# Patient Record
Sex: FEMALE | Race: WHITE | NOT HISPANIC OR LATINO | Employment: FULL TIME | URBAN - METROPOLITAN AREA
[De-identification: names, ages, dates, MRNs, and addresses within clinical notes are randomized per-mention and may not be internally consistent; named-entity substitution may affect disease eponyms.]

---

## 2023-05-01 ENCOUNTER — OFFICE VISIT (OUTPATIENT)
Dept: PRIMARY CARE | Facility: CLINIC | Age: 58
End: 2023-05-01
Payer: COMMERCIAL

## 2023-05-01 VITALS
RESPIRATION RATE: 16 BRPM | TEMPERATURE: 98.3 F | WEIGHT: 137 LBS | BODY MASS INDEX: 22.02 KG/M2 | HEART RATE: 92 BPM | SYSTOLIC BLOOD PRESSURE: 131 MMHG | OXYGEN SATURATION: 95 % | HEIGHT: 66 IN | DIASTOLIC BLOOD PRESSURE: 84 MMHG

## 2023-05-01 DIAGNOSIS — Z00.00 ENCOUNTER FOR ANNUAL PHYSICAL EXAM: Primary | ICD-10-CM

## 2023-05-01 DIAGNOSIS — Z78.9 VEGETARIAN: ICD-10-CM

## 2023-05-01 DIAGNOSIS — Z00.00 HEALTHCARE MAINTENANCE: ICD-10-CM

## 2023-05-01 DIAGNOSIS — R05.3 CHRONIC COUGH: ICD-10-CM

## 2023-05-01 PROCEDURE — 99396 PREV VISIT EST AGE 40-64: CPT | Performed by: FAMILY MEDICINE

## 2023-05-01 PROCEDURE — 1036F TOBACCO NON-USER: CPT | Performed by: FAMILY MEDICINE

## 2023-05-01 RX ORDER — CYANOCOBALAMIN (VITAMIN B-12) 500 MCG
400 TABLET ORAL DAILY
COMMUNITY

## 2023-05-01 RX ORDER — ONDANSETRON HYDROCHLORIDE 8 MG/1
8 TABLET, FILM COATED ORAL EVERY 8 HOURS PRN
COMMUNITY
Start: 2022-06-22

## 2023-05-01 RX ORDER — MULTIVITAMIN
1 TABLET ORAL DAILY
COMMUNITY

## 2023-05-01 NOTE — PROGRESS NOTES
"Subjective   PMH:  colon polyps-C-scope 2020 [facility] rpt 3 yr  dad w breast ca  PPX b/l mastectomy. neg genetic testing 2015  tamoxifen x 5 yr. DC 2015   NO MAMMOS  nml pap neg HPV 6/2021 [facility]  hosp-duodenal diverticulitis 11/2021  EGD GERD 4/2022  vegeterian x 20 yr  works from home   HYSTERECTOMY ([facility]) 6/2022    Hysterectomy in June for thickened endometerium but path was benign (fibroids)  Mood is good  Getting c-scope in June for ongoing GI issues  Notes cough in throat for a few mo. DC protonix in early march by GI       Current Outpatient Medications on File Prior to Visit   Medication Sig Dispense Refill    cholecalciferol (Vitamin D-3) 10 MCG (400 UNIT) tablet Take 1 tablet (400 Units) by mouth once daily.      multivitamin (Multiple Vitamins) tablet Take 1 tablet by mouth once daily.      ondansetron (Zofran) 8 mg tablet Take 1 tablet (8 mg) by mouth every 8 hours if needed.       No current facility-administered medications on file prior to visit.                  Objective   /84   Pulse 92   Temp 36.8 °C (98.3 °F)   Resp 16   Ht 1.685 m (5' 6.34\")   Wt 62.1 kg (137 lb)   SpO2 95%   BMI 21.89 kg/m²    Physical Exam  General: NAD  HEENT:NCAT, PERRLA, nml OP, b/l TM clear, patent nares   Neck: no cervical DIANN  Heart: RRR no murmur, no edema   Lungs: CTA b/l, no wheeze or rhonchi   GI: abd soft, nontender, nondistended.   Breast: symmetric, no masses, rashes, lesions, axillary DIANN. B/l implants   MSK: no c/c/e. nml gait   Skin: warm and dry  Psych: cooperative, appropriate affect  Neuro: speech clear. A&Ox3  Assessment/Plan   Problem List Items Addressed This Visit    None  Visit Diagnoses       Encounter for annual physical exam    -  Primary  CPE:overall doing well. Discussed diet/ex changes  BMI: 21 at goal  VACC: reviewed UTD, only one shingrix d/t intol   COLON CA SCRN: upcoming c-scope next mo  LABS: ordered  PAP: no longer needed to [facility] for benign reason   MAMMO: b/l mastectomy, no " mammos  DEXA: 65   RTC yearly or prn for CPE w labs prior     Relevant Orders    CBC and Auto Differential    Comprehensive Metabolic Panel    Hemoglobin A1C    Lipid Panel    TSH with reflex to Free T4 if abnormal    Vegetarian        Relevant Orders    Vitamin B12    Folate    Vitamin B12    Folate    Chronic cough      -c/w GERD assoc cough. Started ater DC protonix. Trial of restarting protonix. If cough does not improve, will RF to ENT. No upper resp symtpoms.

## 2023-05-02 ENCOUNTER — LAB (OUTPATIENT)
Dept: LAB | Facility: LAB | Age: 58
End: 2023-05-02
Payer: COMMERCIAL

## 2023-05-02 DIAGNOSIS — Z00.00 ENCOUNTER FOR ANNUAL PHYSICAL EXAM: ICD-10-CM

## 2023-05-02 DIAGNOSIS — Z00.00 HEALTHCARE MAINTENANCE: ICD-10-CM

## 2023-05-02 DIAGNOSIS — Z78.9 VEGETARIAN: ICD-10-CM

## 2023-05-02 LAB
BASOPHILS (10*3/UL) IN BLOOD BY AUTOMATED COUNT: 0.02 X10E9/L (ref 0–0.1)
BASOPHILS/100 LEUKOCYTES IN BLOOD BY AUTOMATED COUNT: 0.4 % (ref 0–2)
COBALAMIN (VITAMIN B12) (PG/ML) IN SER/PLAS: 394 PG/ML (ref 211–911)
EOSINOPHILS (10*3/UL) IN BLOOD BY AUTOMATED COUNT: 0.18 X10E9/L (ref 0–0.7)
EOSINOPHILS/100 LEUKOCYTES IN BLOOD BY AUTOMATED COUNT: 3.3 % (ref 0–6)
ERYTHROCYTE DISTRIBUTION WIDTH (RATIO) BY AUTOMATED COUNT: 12.3 % (ref 11.5–14.5)
ERYTHROCYTE MEAN CORPUSCULAR HEMOGLOBIN CONCENTRATION (G/DL) BY AUTOMATED: 32.9 G/DL (ref 32–36)
ERYTHROCYTE MEAN CORPUSCULAR VOLUME (FL) BY AUTOMATED COUNT: 97 FL (ref 80–100)
ERYTHROCYTES (10*6/UL) IN BLOOD BY AUTOMATED COUNT: 4.3 X10E12/L (ref 4–5.2)
ESTIMATED AVERAGE GLUCOSE FOR HBA1C: 100 MG/DL
FOLATE (NG/ML) IN SER/PLAS: >24 NG/ML
HEMATOCRIT (%) IN BLOOD BY AUTOMATED COUNT: 41.9 % (ref 36–46)
HEMOGLOBIN (G/DL) IN BLOOD: 13.8 G/DL (ref 12–16)
HEMOGLOBIN A1C/HEMOGLOBIN TOTAL IN BLOOD: 5.1 %
IMMATURE GRANULOCYTES/100 LEUKOCYTES IN BLOOD BY AUTOMATED COUNT: 0 % (ref 0–0.9)
LEUKOCYTES (10*3/UL) IN BLOOD BY AUTOMATED COUNT: 5.5 X10E9/L (ref 4.4–11.3)
LYMPHOCYTES (10*3/UL) IN BLOOD BY AUTOMATED COUNT: 1.72 X10E9/L (ref 1.2–4.8)
LYMPHOCYTES/100 LEUKOCYTES IN BLOOD BY AUTOMATED COUNT: 31.3 % (ref 13–44)
MONOCYTES (10*3/UL) IN BLOOD BY AUTOMATED COUNT: 0.31 X10E9/L (ref 0.1–1)
MONOCYTES/100 LEUKOCYTES IN BLOOD BY AUTOMATED COUNT: 5.6 % (ref 2–10)
NEUTROPHILS (10*3/UL) IN BLOOD BY AUTOMATED COUNT: 3.27 X10E9/L (ref 1.2–7.7)
NEUTROPHILS/100 LEUKOCYTES IN BLOOD BY AUTOMATED COUNT: 59.4 % (ref 40–80)
NRBC (PER 100 WBCS) BY AUTOMATED COUNT: 0 /100 WBC (ref 0–0)
PLATELETS (10*3/UL) IN BLOOD AUTOMATED COUNT: 242 X10E9/L (ref 150–450)
THYROTROPIN (MIU/L) IN SER/PLAS BY DETECTION LIMIT <= 0.05 MIU/L: 1.73 MIU/L (ref 0.44–3.98)

## 2023-05-02 PROCEDURE — 85025 COMPLETE CBC W/AUTO DIFF WBC: CPT

## 2023-05-02 PROCEDURE — 83036 HEMOGLOBIN GLYCOSYLATED A1C: CPT

## 2023-05-02 PROCEDURE — 82746 ASSAY OF FOLIC ACID SERUM: CPT

## 2023-05-02 PROCEDURE — 36415 COLL VENOUS BLD VENIPUNCTURE: CPT

## 2023-05-02 PROCEDURE — 80061 LIPID PANEL: CPT

## 2023-05-02 PROCEDURE — 80053 COMPREHEN METABOLIC PANEL: CPT

## 2023-05-02 PROCEDURE — 82607 VITAMIN B-12: CPT

## 2023-05-02 PROCEDURE — 84443 ASSAY THYROID STIM HORMONE: CPT

## 2023-05-03 LAB
ALANINE AMINOTRANSFERASE (SGPT) (U/L) IN SER/PLAS: 21 U/L (ref 7–45)
ALBUMIN (G/DL) IN SER/PLAS: 4.6 G/DL (ref 3.4–5)
ALKALINE PHOSPHATASE (U/L) IN SER/PLAS: 71 U/L (ref 33–110)
ANION GAP IN SER/PLAS: 14 MMOL/L (ref 10–20)
ASPARTATE AMINOTRANSFERASE (SGOT) (U/L) IN SER/PLAS: 28 U/L (ref 9–39)
BILIRUBIN TOTAL (MG/DL) IN SER/PLAS: 0.5 MG/DL (ref 0–1.2)
CALCIUM (MG/DL) IN SER/PLAS: 10.1 MG/DL (ref 8.6–10.6)
CARBON DIOXIDE, TOTAL (MMOL/L) IN SER/PLAS: 30 MMOL/L (ref 21–32)
CHLORIDE (MMOL/L) IN SER/PLAS: 100 MMOL/L (ref 98–107)
CHOLESTEROL (MG/DL) IN SER/PLAS: 247 MG/DL (ref 0–199)
CHOLESTEROL IN HDL (MG/DL) IN SER/PLAS: 85.7 MG/DL
CHOLESTEROL/HDL RATIO: 2.9
CREATININE (MG/DL) IN SER/PLAS: 0.72 MG/DL (ref 0.5–1.05)
GFR FEMALE: >90 ML/MIN/1.73M2
GLUCOSE (MG/DL) IN SER/PLAS: 94 MG/DL (ref 74–99)
LDL: 112 MG/DL (ref 0–99)
NON HDL CHOLESTEROL: 161 MG/DL
POTASSIUM (MMOL/L) IN SER/PLAS: 4.6 MMOL/L (ref 3.5–5.3)
PROTEIN TOTAL: 7.3 G/DL (ref 6.4–8.2)
SODIUM (MMOL/L) IN SER/PLAS: 139 MMOL/L (ref 136–145)
TRIGLYCERIDE (MG/DL) IN SER/PLAS: 246 MG/DL (ref 0–149)
UREA NITROGEN (MG/DL) IN SER/PLAS: 12 MG/DL (ref 6–23)
VLDL: 49 MG/DL (ref 0–40)

## 2023-05-03 NOTE — RESULT ENCOUNTER NOTE
TG have increased. Was she completely fasting for at least 6 hrs? If not should recheck. If so she can take fish oil, flax seed meal to help lower TG.     All other labs nml

## 2023-06-02 ENCOUNTER — HOSPITAL ENCOUNTER (OUTPATIENT)
Dept: DATA CONVERSION | Facility: HOSPITAL | Age: 58
End: 2023-06-02
Attending: INTERNAL MEDICINE
Payer: COMMERCIAL

## 2023-06-02 DIAGNOSIS — K64.4 RESIDUAL HEMORRHOIDAL SKIN TAGS: ICD-10-CM

## 2023-06-02 DIAGNOSIS — K21.9 GASTRO-ESOPHAGEAL REFLUX DISEASE WITHOUT ESOPHAGITIS: ICD-10-CM

## 2023-06-02 DIAGNOSIS — K44.9 DIAPHRAGMATIC HERNIA WITHOUT OBSTRUCTION OR GANGRENE: ICD-10-CM

## 2023-06-02 DIAGNOSIS — R19.4 CHANGE IN BOWEL HABIT: ICD-10-CM

## 2023-06-02 DIAGNOSIS — R10.9 UNSPECIFIED ABDOMINAL PAIN: ICD-10-CM

## 2023-06-02 DIAGNOSIS — K57.30 DIVERTICULOSIS OF LARGE INTESTINE WITHOUT PERFORATION OR ABSCESS WITHOUT BLEEDING: ICD-10-CM

## 2023-06-02 DIAGNOSIS — K62.5 HEMORRHAGE OF ANUS AND RECTUM: ICD-10-CM

## 2023-06-02 DIAGNOSIS — G47.33 OBSTRUCTIVE SLEEP APNEA (ADULT) (PEDIATRIC): ICD-10-CM

## 2023-06-02 DIAGNOSIS — K57.31 DIVERTICULOSIS OF LARGE INTESTINE WITHOUT PERFORATION OR ABSCESS WITH BLEEDING: ICD-10-CM

## 2023-06-07 LAB
COMPLETE PATHOLOGY REPORT: NORMAL
CONVERTED CLINICAL DIAGNOSIS-HISTORY: NORMAL
CONVERTED FINAL DIAGNOSIS: NORMAL
CONVERTED FINAL REPORT PDF LINK TO COPY AND PASTE: NORMAL
CONVERTED GROSS DESCRIPTION: NORMAL

## 2024-04-23 ENCOUNTER — TELEPHONE (OUTPATIENT)
Dept: PRIMARY CARE | Facility: CLINIC | Age: 59
End: 2024-04-23

## 2024-04-23 DIAGNOSIS — Z00.00 ENCOUNTER FOR ANNUAL PHYSICAL EXAM: Primary | ICD-10-CM

## 2024-04-25 ENCOUNTER — LAB REQUISITION (OUTPATIENT)
Dept: LAB | Facility: HOSPITAL | Age: 59
End: 2024-04-25
Payer: COMMERCIAL

## 2024-04-25 DIAGNOSIS — N39.0 URINARY TRACT INFECTION, SITE NOT SPECIFIED: ICD-10-CM

## 2024-04-25 PROCEDURE — 87086 URINE CULTURE/COLONY COUNT: CPT

## 2024-04-25 PROCEDURE — 87186 SC STD MICRODIL/AGAR DIL: CPT

## 2024-04-28 LAB — BACTERIA UR CULT: ABNORMAL

## 2024-05-01 ENCOUNTER — LAB (OUTPATIENT)
Dept: LAB | Facility: LAB | Age: 59
End: 2024-05-01
Payer: COMMERCIAL

## 2024-05-01 DIAGNOSIS — Z00.00 ENCOUNTER FOR ANNUAL PHYSICAL EXAM: ICD-10-CM

## 2024-05-01 LAB
ALBUMIN SERPL BCP-MCNC: 4.3 G/DL (ref 3.4–5)
ALP SERPL-CCNC: 70 U/L (ref 33–110)
ALT SERPL W P-5'-P-CCNC: 13 U/L (ref 7–45)
ANION GAP SERPL CALC-SCNC: 10 MMOL/L (ref 10–20)
AST SERPL W P-5'-P-CCNC: 19 U/L (ref 9–39)
BASOPHILS # BLD AUTO: 0.03 X10*3/UL (ref 0–0.1)
BASOPHILS NFR BLD AUTO: 0.6 %
BILIRUB SERPL-MCNC: 0.6 MG/DL (ref 0–1.2)
BUN SERPL-MCNC: 9 MG/DL (ref 6–23)
CALCIUM SERPL-MCNC: 9.8 MG/DL (ref 8.6–10.6)
CHLORIDE SERPL-SCNC: 101 MMOL/L (ref 98–107)
CHOLEST SERPL-MCNC: 219 MG/DL (ref 0–199)
CHOLESTEROL/HDL RATIO: 2.7
CO2 SERPL-SCNC: 36 MMOL/L (ref 21–32)
CREAT SERPL-MCNC: 0.71 MG/DL (ref 0.5–1.05)
EGFRCR SERPLBLD CKD-EPI 2021: >90 ML/MIN/1.73M*2
EOSINOPHIL # BLD AUTO: 0.13 X10*3/UL (ref 0–0.7)
EOSINOPHIL NFR BLD AUTO: 2.5 %
ERYTHROCYTE [DISTWIDTH] IN BLOOD BY AUTOMATED COUNT: 12 % (ref 11.5–14.5)
EST. AVERAGE GLUCOSE BLD GHB EST-MCNC: 100 MG/DL
GLUCOSE SERPL-MCNC: 98 MG/DL (ref 74–99)
HBA1C MFR BLD: 5.1 %
HCT VFR BLD AUTO: 39.8 % (ref 36–46)
HDLC SERPL-MCNC: 81 MG/DL
HGB BLD-MCNC: 13.4 G/DL (ref 12–16)
IMM GRANULOCYTES # BLD AUTO: 0.01 X10*3/UL (ref 0–0.7)
IMM GRANULOCYTES NFR BLD AUTO: 0.2 % (ref 0–0.9)
LDLC SERPL CALC-MCNC: 112 MG/DL
LYMPHOCYTES # BLD AUTO: 1.64 X10*3/UL (ref 1.2–4.8)
LYMPHOCYTES NFR BLD AUTO: 31.7 %
MCH RBC QN AUTO: 32.1 PG (ref 26–34)
MCHC RBC AUTO-ENTMCNC: 33.7 G/DL (ref 32–36)
MCV RBC AUTO: 95 FL (ref 80–100)
MONOCYTES # BLD AUTO: 0.37 X10*3/UL (ref 0.1–1)
MONOCYTES NFR BLD AUTO: 7.1 %
NEUTROPHILS # BLD AUTO: 3 X10*3/UL (ref 1.2–7.7)
NEUTROPHILS NFR BLD AUTO: 57.9 %
NON HDL CHOLESTEROL: 138 MG/DL (ref 0–149)
NRBC BLD-RTO: 0 /100 WBCS (ref 0–0)
PLATELET # BLD AUTO: 233 X10*3/UL (ref 150–450)
POTASSIUM SERPL-SCNC: 4.6 MMOL/L (ref 3.5–5.3)
PROT SERPL-MCNC: 7 G/DL (ref 6.4–8.2)
RBC # BLD AUTO: 4.17 X10*6/UL (ref 4–5.2)
SODIUM SERPL-SCNC: 142 MMOL/L (ref 136–145)
TRIGL SERPL-MCNC: 131 MG/DL (ref 0–149)
TSH SERPL-ACNC: 1.65 MIU/L (ref 0.44–3.98)
VLDL: 26 MG/DL (ref 0–40)
WBC # BLD AUTO: 5.2 X10*3/UL (ref 4.4–11.3)

## 2024-05-01 PROCEDURE — 85025 COMPLETE CBC W/AUTO DIFF WBC: CPT

## 2024-05-01 PROCEDURE — 83036 HEMOGLOBIN GLYCOSYLATED A1C: CPT

## 2024-05-01 PROCEDURE — 80061 LIPID PANEL: CPT

## 2024-05-01 PROCEDURE — 80053 COMPREHEN METABOLIC PANEL: CPT

## 2024-05-01 PROCEDURE — 84443 ASSAY THYROID STIM HORMONE: CPT

## 2024-05-01 PROCEDURE — 36415 COLL VENOUS BLD VENIPUNCTURE: CPT

## 2024-05-02 ENCOUNTER — APPOINTMENT (OUTPATIENT)
Dept: PRIMARY CARE | Facility: CLINIC | Age: 59
End: 2024-05-02
Payer: COMMERCIAL

## 2024-05-15 ENCOUNTER — APPOINTMENT (OUTPATIENT)
Dept: PRIMARY CARE | Facility: CLINIC | Age: 59
End: 2024-05-15
Payer: COMMERCIAL

## 2024-05-29 ENCOUNTER — LAB REQUISITION (OUTPATIENT)
Dept: LAB | Facility: HOSPITAL | Age: 59
End: 2024-05-29
Payer: COMMERCIAL

## 2024-05-29 DIAGNOSIS — J02.9 ACUTE PHARYNGITIS, UNSPECIFIED: ICD-10-CM

## 2024-05-29 PROCEDURE — 87651 STREP A DNA AMP PROBE: CPT

## 2024-05-30 LAB — S PYO DNA THROAT QL NAA+PROBE: NOT DETECTED

## 2024-06-28 ENCOUNTER — APPOINTMENT (OUTPATIENT)
Dept: OBSTETRICS AND GYNECOLOGY | Facility: CLINIC | Age: 59
End: 2024-06-28
Payer: COMMERCIAL

## 2024-07-01 ENCOUNTER — APPOINTMENT (OUTPATIENT)
Dept: PRIMARY CARE | Facility: CLINIC | Age: 59
End: 2024-07-01
Payer: COMMERCIAL

## 2024-08-22 ENCOUNTER — OFFICE VISIT (OUTPATIENT)
Dept: OBSTETRICS AND GYNECOLOGY | Facility: CLINIC | Age: 59
End: 2024-08-22
Payer: COMMERCIAL

## 2024-08-22 VITALS
SYSTOLIC BLOOD PRESSURE: 114 MMHG | WEIGHT: 135 LBS | BODY MASS INDEX: 21.69 KG/M2 | DIASTOLIC BLOOD PRESSURE: 77 MMHG | HEIGHT: 66 IN

## 2024-08-22 DIAGNOSIS — Z80.3 FAMILY HISTORY OF BREAST CANCER: ICD-10-CM

## 2024-08-22 DIAGNOSIS — Z87.440 HISTORY OF UTI: ICD-10-CM

## 2024-08-22 DIAGNOSIS — N39.3 SUI (STRESS URINARY INCONTINENCE, FEMALE): Primary | ICD-10-CM

## 2024-08-22 LAB
POC APPEARANCE, URINE: CLEAR
POC BILIRUBIN, URINE: NEGATIVE
POC BLOOD, URINE: ABNORMAL
POC COLOR, URINE: YELLOW
POC GLUCOSE, URINE: NEGATIVE MG/DL
POC KETONES, URINE: NEGATIVE MG/DL
POC LEUKOCYTES, URINE: ABNORMAL
POC NITRITE,URINE: NEGATIVE
POC PH, URINE: 7 PH
POC PROTEIN, URINE: NEGATIVE MG/DL
POC SPECIFIC GRAVITY, URINE: 1.02
POC UROBILINOGEN, URINE: 0.2 EU/DL

## 2024-08-22 PROCEDURE — 99204 OFFICE O/P NEW MOD 45 MIN: CPT | Performed by: OBSTETRICS & GYNECOLOGY

## 2024-08-22 PROCEDURE — 81003 URINALYSIS AUTO W/O SCOPE: CPT | Performed by: OBSTETRICS & GYNECOLOGY

## 2024-08-22 PROCEDURE — 99214 OFFICE O/P EST MOD 30 MIN: CPT | Performed by: OBSTETRICS & GYNECOLOGY

## 2024-08-22 PROCEDURE — 51798 US URINE CAPACITY MEASURE: CPT | Performed by: OBSTETRICS & GYNECOLOGY

## 2024-08-22 PROCEDURE — 3008F BODY MASS INDEX DOCD: CPT | Performed by: OBSTETRICS & GYNECOLOGY

## 2024-08-22 PROCEDURE — 1036F TOBACCO NON-USER: CPT | Performed by: OBSTETRICS & GYNECOLOGY

## 2024-08-22 ASSESSMENT — ENCOUNTER SYMPTOMS
DYSURIA: 1
CONSTITUTIONAL NEGATIVE: 1
ENDOCRINE NEGATIVE: 1
FREQUENCY: 1
MUSCULOSKELETAL NEGATIVE: 1
EYES NEGATIVE: 1
CARDIOVASCULAR NEGATIVE: 1
RESPIRATORY NEGATIVE: 1
NEUROLOGICAL NEGATIVE: 1
PSYCHIATRIC NEGATIVE: 1

## 2024-08-22 ASSESSMENT — PAIN SCALES - GENERAL: PAINLEVEL: 0-NO PAIN

## 2024-08-22 NOTE — PROGRESS NOTES
[facility] Department of Urogynecology   Charisse Chavez MD, MPH   [phone]    ASSESSMENT AND PLAN:     59-year-old female being assessed for MALCOLM, nocturia, and history of UTI.    Diagnoses:  #1 Stress urinary incontinence  #2 History of UTI    Plan:  MALCOLM, nocturia  - Advised to reduce fluid intake to around 70 oz per day.  - PFPT requisition sent today and gave her the list of local physical therapists with their corresponding locations/contact information.  - Discussed lifestyle modifications, including avoiding bladder irritants and regular bathroom breaks.  - Advised to limit fluid intake before bedtime and further avoid bladder irritants for nocturia.  - PVR = 0 mL by U/S    2. History of UTI  - Discussed considering vaginal estrogen cream to prevent UTIs, though the patient is hesitant and ultimately decided against introducing vaginal estrogen cream to her regimen at this visit. Discussed that we offer it to people who have even had breast cancer in the past and that it is one easy way to start to take bladder infections off the table.   - Standing order for urine cultures to be performed during symptomatic episodes.  - Discussed the possibility of interstitial cystitis (IC) and its management, including OTC AZO for symptom relief and D-mannose for E. Coli-related UTI prevention.    Follow-up with Dr. Chavez in 3-4 months to reassess symptoms and discuss the potential use of vaginal estrogen if UTIs persist.    Scribe Attestation  By signing my name below, Cooper LITTLE Scribe, attest that this documentation has been prepared under the direction and in the presence of Charisse Chavez MD MPH on 08/22/2024 at 1:00 PM.      Problem List Items Addressed This Visit    None  Visit Diagnoses       MALCOLM (stress urinary incontinence, female)    -  Primary    Relevant Orders    Measure post void residual (Completed)    POCT UA Automated manually resulted    Referral to Physical Therapy    History of  UTI        Relevant Orders    Urinalysis with Reflex Culture and Microscopic           I spent a total of 45 minutes in face to face and non face to face time.     I Dr. Chavez, personally performed the services described in the documentation as scribed in my presence and confirm it is both complete and accurate.  Charisse Chavez MD, MPH, FACOG           New    HISTORY OF PRESENT ILLNESS:     59-year-old female being assessed for MALCOLM, nocturia, and history of UTI.     Record Review:   - 4/25/2024 Urine culture: >100,000 E. Coli      Urinary Symptoms:   - She has concerns about Violetta symptoms and urinary incontinence.  - She thinks 3-4x/year she feels like she's getting a UTI. With this comes urgency, dysuria, incomplete emptying sensation, and bladder pressure. She'll go to a clinic and get the urine checked and it comes back negative most of the time.  - She doesn't associate any triggers with these symptoms, noting that they pretty much come out of nowhere.   - She is having some pretty significant urgency.  - If she can't get to the bathroom in time, she'll have a little bit of an accident, which happens 4-5x/month.  - She drinks 4-5 big José Miguel cups of water a day due to some GI issues.  - She doesn't drink caffeine or soda.  - She has nocturia 2-3x/night.  - She leaks with running, coughing, etc, but this is much less bothersome to her than the urgency-related symptoms.  - She notes that her UTI symptoms normally go away within a couple of days.  - She has some antibiotics at home that usually help the symptoms go away, which usually go away in approximately 3-4 days.    Bowel Symptoms:   - She has some bloating with gastritis.  - She is not constipated.  - She denies having diarrhea.    Vaginal Symptoms:  - She denies any vaginal dryness or irritation.    OBGYN History and Sexual Activity:   - She is sexually active.  - She denies dyspareunia.  - She is unsure of whether they took her ovaries with her previous  hysterectomy.   - It was a laparoscopic hysterectomy.  - She had a double mastectomy previously associated with risk of breast cancer rather than personal history.  - Every time she went, she would get called back for abnormalities within the breast accompanied by an extensive family history of breast cancer, so they decided she should do the mastectomy.  - She took Tamoxifen for 5 years despite not having breast cancer.       Past Medical History:     Past Medical History:   Diagnosis Date    Abnormal findings on diagnostic imaging of other specified body structures 06/07/2022    Thickened endometrium    Pelvic and perineal pain 06/07/2022    Pelvic pain in female          Past Surgical History:     Past Surgical History:   Procedure Laterality Date    OTHER SURGICAL HISTORY  04/12/2022    Breast reconstruction    OTHER SURGICAL HISTORY  04/12/2022    Mastectomy bilateral    OTHER SURGICAL HISTORY  06/22/2022    Robotic-assisted total laparoscopic hysterectomy         Medications:     Prior to Admission medications    Medication Sig Start Date End Date Taking? Authorizing Provider   cholecalciferol (Vitamin D-3) 10 MCG (400 UNIT) tablet Take 1 tablet (10 mcg) by mouth once daily.   Yes Historical Provider, MD   multivitamin (Multiple Vitamins) tablet Take 1 tablet by mouth once daily.   Yes Historical Provider, MD   ondansetron (Zofran) 8 mg tablet Take 1 tablet (8 mg) by mouth every 8 hours if needed. 6/22/22   Historical Provider, MD BENTON  Review of Systems   Constitutional: Negative.    HENT: Negative.     Eyes: Negative.    Respiratory: Negative.     Cardiovascular: Negative.    Gastrointestinal:         Reports bloating and gastritis but denies constipation or diarrhea.   Endocrine: Negative.    Genitourinary:  Positive for dysuria, frequency and urgency.        Bladder pressure and occasional incontinence. No hematuria or vaginal dryness. No dyspareunia.   Musculoskeletal: Negative.    Neurological:  "Negative.    Psychiatric/Behavioral: Negative.            PHYSICAL EXAM:    /77   Ht 1.676 m (5' 6\")   Wt 61.2 kg (135 lb)   BMI 21.79 kg/m²   No LMP recorded.    Declines chaperone for physical exam.      Well developed, well nourished, in no apparent distress.   Neurologic/Psychiatric:  Awake, Alert and Oriented times 3.  Affect normal.     GENITAL/URINARY:     External Genitalia:  The patient has normal appearing external genitalia, normal skenes and bartholins glands, and a normal hair distribution.  Her vulva is without lesions, erythema or discharge.  It is non-tender with appropriate sensation.     Urethral Meatus:  Size normal, Location normal, Lesions absent, Prolapse absent.    Urethra:  Fullness absent, Masses absent.    Bladder:  Fullness absent, Masses absent, Tenderness absent.    Vagina:  General appearance normal, Estrogen effect normal, Discharge absent, Lesions absent.     Cervix: Normal, no discharge.   Uterus:  surgically absent  Adnexa:   normal        Physical Exam  Genitourinary:      Genitourinary Comments: External genitalia normal. No pain on external examination. Normal sensation. No pain on internal examination aside from mild discomfort with the iliococcygeus on the left and right. Pain over the coccygeus muscle on the right and left. No masses palpated. Normal vaginal epithelium, no abnormal discharge.       Mild vaginal atrophy present.     Pelvic floor neuro is intact.     Anal wink absent and BC reflex absent.   POP-Q measurements were:      Aa: -1, Ba: -1, C:     gH: 3, pB: 3, TVL:     Ap: -1, Bp: -1, D:        She does have myofascial tenderness on exam.            Data and DIAGNOSTIC STUDIES REVIEWED   No results found.   Lab Results   Component Value Date    URINECULTURE >100,000 Escherichia coli (A) 04/25/2024      Lab Results   Component Value Date    GLUCOSE 98 05/01/2024    CALCIUM 9.8 05/01/2024     05/01/2024    K 4.6 05/01/2024    CO2 36 (H) 05/01/2024    CL " 101 05/01/2024    BUN 9 05/01/2024    CREATININE 0.71 05/01/2024     Lab Results   Component Value Date    WBC 5.2 05/01/2024    HGB 13.4 05/01/2024    HCT 39.8 05/01/2024    MCV 95 05/01/2024     05/01/2024

## 2024-08-22 NOTE — PROGRESS NOTES
"Referred by: Brittany Behem, DO    PCP  Brittany Behm, DO         CHIEF COMPLAINT:  sensation of recurrent UTIs with negative culture         HISTORY OF PRESENT ILLNESS:  This is a  59 y.o. y.o. female who presents with urinary urgency and UTI symptoms.     Patient states 3-4x/year she feels the sensation that she has a UTI, however [facility]. Has had one culture positive UTI this year.  She feels urgency, frequency, dysuria, and chills during these \"flares.\" Sxs usually resolve within 3-4 days on their own. She researched interstitial cystitis and feels like this may be what she has.    The following were reviewed to gain additional history:  External notes:  Op note from 2022 [facility] BSO reviewed, final path with focal endometriosis         Specifically, she describes the following pelvic floor symptoms:          Prolapse: No       - Splinting to urinate: No       - Splinting for bowel movement/stool trapping: No              Incontinence:  Yes             Mixed. Leaking with running, C/S/laughing   Leaking when she has urgency, can't make it to the bathroom. Leaking 5x/week. Doesn't wear panty liner.              Urinary Symptoms:       - Frequency:  No             # Voids:  every 2 hours       - Nocturia: Yes             # Voids:  2-3x/night       - Urgency:  Yes, daily, but not every time       - Incomplete emptying:  Yes - will get up and then have to go back to the bathroom again       - Hesitancy:  No       - Pain with voiding:  Yes - only occasionally, when she feels like she has a UTI, burning sensation       - Excessive fluid intake: Yes - drinks full anne cup 4-5x/day, drinks decaf coffee and tea, no soda               History:       - Recurrent UTI:  No, one in the last year       - Hematuria:  No       - Stones:  No       - Kidney Disease:  No                  Bowel Symptoms:       - Has chronic bloating and upper GI discomfort, known duodenal diverticulosis. Follows with " "GI        - Regular: Yes, 2x/day       - Diarrhea:No       - Constipation: No       - Fecal Incontinence:  No       - Flatus Incontinence:  No       - Fecal urgency:   No    Past medical and surgical hx reviewed - pertinent for   - strong FH breast cancer, father was genetic tested,not BRCA but given high risk family history she is s/o double mastectomy and reconstruction, took tamoxifen x5 years  - h/o [facility] BSO for thickened endometrium, benign pathology    Smoking history: none        Gyn History:  - Menopausal: Yes, ~2014           Postmenopausal bleeding: No, h/o [facility]  - HRT: No, was hon Tamoxifen preventatively for family hx (not BRCA)  - Pap up to date: Yes    History of abnormal pap: No  - Sexually active:  Yes  Dyspareunia: No   Other issues: none  - Number of prior vaginal deliveries: 3   Number of prior operative deliveries: 1 forceps for P1   Prior OASI? had episiotomy, no knowledge of OASIS    - Mammogram up to date: Yes, s/p bilat mastectomy and reconstruction  - Colonoscopy up to date: Yes    OB History    No obstetric history on file.               PHYSICAL EXAMINATION:  No LMP recorded.  Body mass index is 21.79 kg/m².  /77   Ht 1.676 m (5' 6\")   Wt 61.2 kg (135 lb)   BMI 21.79 kg/m²   General Appearance: well appearing  Neuro: Alert and oriented   HEENT: mucous membranes moist, neck supple  Resp: No respiratory distress, normal work of breathing  MSK: normal range of motion, gait appropriate    Pelvic:  Genitourinary: normal external genitalia, Bartholin's glands negative, [place]'s glands negative  Urethra: normal meatus, non-tender, no periurethral mass  Vaginal mucosa mildly atrophic,  no lesions or masses  Cervix surgically absent, cuff normal without masses  Uterus surgically absent  Adnexae no masses  Atrophy positive    CST negative    POP-Q (in supine position):       Aa -1     Ba -1     C 7              gh 3     pb 3     tvl 7              Ap -1     Bp -1     D       IMPRESSION AND " PLAN:  Cori Adorno is a 59 y.o. who presents with MALCOLM and UTIs..     Diagnoses:  #1 Urge urinary incontinence  #2 History of UTI     Plan:  UUI  - Advised to reduce fluid intake to around 70 oz per day.  - PFPT requisition sent today and gave her the list of local physical therapists with their corresponding locations/contact information.  - Discussed lifestyle modifications, including avoiding bladder irritants and regular bathroom breaks.  - Advised to limit fluid intake before bedtime and further avoid bladder irritants for nocturia.  - recommend lozenges for dry mouth as opposed to drinking excessive fluids  - PVR = 0 mL by U/S     2. History of UTI  - Discussed considering vaginal estrogen cream to prevent UTIs, though the patient is hesitant and ultimately decided against introducing vaginal estrogen cream to her regimen at this visit. Discussed that we offer it to people who have even had breast cancer in the past and that it is one easy way to start to take bladder infections off the table.   - Standing order for urine cultures to be performed during symptomatic episodes.  - Discussed the possibility of interstitial cystitis (IC) and its management, including OTC AZO for symptom relief and D-mannose for E. Coli-related UTI prevention.    All questions and concerns were answered and addressed.  The patient expressed understanding and agrees with the plan.     Follow up with Dr. Chavez in 4 months    Calista Landon MD  PGY-3, Obstetrics and Gynecology

## 2024-08-22 NOTE — PATIENT INSTRUCTIONS
We discussed lifestyle changes includin) Aiming to drink around 60 oz total of fluids per day   2) Avoiding bladder irritants such as caffeine, nicotine, artificial sweeteners   3) Stop drinking fluids 3 hours before bedtime   4) Timed voids every 2-3 hours.    5) PFPT

## 2024-08-26 PROCEDURE — 88305 TISSUE EXAM BY PATHOLOGIST: CPT

## 2024-08-28 ENCOUNTER — LAB REQUISITION (OUTPATIENT)
Dept: DERMATOPATHOLOGY | Facility: CLINIC | Age: 59
End: 2024-08-28
Payer: COMMERCIAL

## 2024-08-28 DIAGNOSIS — L53.8 OTHER SPECIFIED ERYTHEMATOUS CONDITIONS: ICD-10-CM

## 2024-08-28 DIAGNOSIS — D48.5 NEOPLASM OF UNCERTAIN BEHAVIOR OF SKIN: ICD-10-CM

## 2024-08-29 LAB
LABORATORY COMMENT REPORT: NORMAL
PATH REPORT.FINAL DX SPEC: NORMAL
PATH REPORT.GROSS SPEC: NORMAL
PATH REPORT.MICROSCOPIC SPEC OTHER STN: NORMAL
PATH REPORT.RELEVANT HX SPEC: NORMAL
PATH REPORT.TOTAL CANCER: NORMAL

## 2024-09-09 ENCOUNTER — EVALUATION (OUTPATIENT)
Dept: PHYSICAL THERAPY | Facility: CLINIC | Age: 59
End: 2024-09-09
Payer: COMMERCIAL

## 2024-09-09 DIAGNOSIS — R29.3 ABNORMAL POSTURE: ICD-10-CM

## 2024-09-09 DIAGNOSIS — N39.3 SUI (STRESS URINARY INCONTINENCE, FEMALE): ICD-10-CM

## 2024-09-09 DIAGNOSIS — M62.81 MUSCLE WEAKNESS: ICD-10-CM

## 2024-09-09 DIAGNOSIS — M62.89 MUSCLE TIGHTNESS: Primary | ICD-10-CM

## 2024-09-09 PROCEDURE — 97535 SELF CARE MNGMENT TRAINING: CPT | Mod: GP | Performed by: PHYSICAL THERAPIST

## 2024-09-09 PROCEDURE — 97162 PT EVAL MOD COMPLEX 30 MIN: CPT | Mod: GP | Performed by: PHYSICAL THERAPIST
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
## 2024-09-25 ENCOUNTER — APPOINTMENT (OUTPATIENT)
Dept: PRIMARY CARE | Facility: CLINIC | Age: 59
End: 2024-09-25
Payer: COMMERCIAL

## 2024-10-01 ENCOUNTER — APPOINTMENT (OUTPATIENT)
Dept: PRIMARY CARE | Facility: CLINIC | Age: 59
End: 2024-10-01
Payer: COMMERCIAL

## 2024-10-01 VITALS
OXYGEN SATURATION: 98 % | WEIGHT: 131 LBS | DIASTOLIC BLOOD PRESSURE: 82 MMHG | RESPIRATION RATE: 16 BRPM | HEART RATE: 88 BPM | HEIGHT: 66 IN | TEMPERATURE: 98 F | BODY MASS INDEX: 21.05 KG/M2 | SYSTOLIC BLOOD PRESSURE: 140 MMHG

## 2024-10-01 DIAGNOSIS — Z00.00 ENCOUNTER FOR ANNUAL PHYSICAL EXAM: ICD-10-CM

## 2024-10-01 DIAGNOSIS — E78.5 HYPERLIPIDEMIA, UNSPECIFIED HYPERLIPIDEMIA TYPE: Primary | ICD-10-CM

## 2024-10-01 DIAGNOSIS — R59.0 CERVICAL LYMPHADENOPATHY: ICD-10-CM

## 2024-10-01 DIAGNOSIS — G89.29 CHRONIC RIGHT SHOULDER PAIN: ICD-10-CM

## 2024-10-01 DIAGNOSIS — M25.511 CHRONIC RIGHT SHOULDER PAIN: ICD-10-CM

## 2024-10-01 PROCEDURE — 99213 OFFICE O/P EST LOW 20 MIN: CPT | Performed by: FAMILY MEDICINE

## 2024-10-01 PROCEDURE — 99396 PREV VISIT EST AGE 40-64: CPT | Performed by: FAMILY MEDICINE

## 2024-10-01 PROCEDURE — 3008F BODY MASS INDEX DOCD: CPT | Performed by: FAMILY MEDICINE

## 2024-10-01 PROCEDURE — 1036F TOBACCO NON-USER: CPT | Performed by: FAMILY MEDICINE

## 2024-10-01 NOTE — PROGRESS NOTES
"Subjective   Cori Adorno is a 59 y.o. female who presents for Annual Exam.  HPI  PMH:  colon polyps-C-scope 2020 [facility], 6/2023 nml rpt 5 yrs   dad w breast ca  PPX b/l mastectomy. neg genetic testing 2015  tamoxifen x 5 yr. DC 2015   NO MAMMOS  nml pap neg HPV 6/2021 [facility]  hosp-duodenal diverticulitis 11/2021  EGD GERD 4/2022  vegeterian x 20 yr  HYSTERECTOMY (TLH) 6/2022, BENIGN   ONE shingrix  Here for CPE     Started pelvic floor PT for dysuria.     Cyst on L neck for years.     Chronic cough resolved.     Buldging vein in L forehead.     R upper arm pain irwin w lifting arm x 6 mo. No weakness.     Son getting  SD in nov   Current Outpatient Medications on File Prior to Visit   Medication Sig Dispense Refill    cholecalciferol (Vitamin D-3) 10 MCG (400 UNIT) tablet Take 1 tablet (10 mcg) by mouth once daily.      multivitamin (Multiple Vitamins) tablet Take 1 tablet by mouth once daily.      ondansetron (Zofran) 8 mg tablet Take 1 tablet (8 mg) by mouth every 8 hours if needed.       No current facility-administered medications on file prior to visit.                  Objective   /82 (BP Location: Left arm)   Pulse 88   Temp 36.7 °C (98 °F)   Resp 16   Ht 1.676 m (5' 6\")   Wt 59.4 kg (131 lb)   SpO2 98%   BMI 21.14 kg/m²    Physical Exam  General: NAD  HEENT:NCAT, PERRLA, nml OP, b/l TM clear. No palpable temporal arterty  Neck: L post cerv mobile soft nontender small round LN   Heart: RRR no murmur, no edema  Lungs: CTA b/l, no wheeze or rhonchi   GI: abd soft, nontender, nondistended.   MSK: no c/c/e. nml gait   Nml R shoulder ROM, neg drop can, nml sens  Skin: warm and dry  Psych: cooperative, appropriate affect  Neuro: speech clear. A&Ox3  Assessment/Plan   Problem List Items Addressed This Visit    None  Visit Diagnoses       Hyperlipidemia, unspecified hyperlipidemia type      ASCVD 2%  Parents w CAD  Check Ca score       Encounter for annual physical exam      CPE:overall doing well. Cont " balanced diet/reg ex   BMI: 21  VACC: reviewed, tdap UTD, 1 shingrix, consider flu/covid, RSV 75  COLON CA SCRN: UTD  LABS: reviewed w pt at goal besides mildly elevated lipids  PAP: n/a TLH benign   MAMMO: n/a masectomy  DEXA: 65  RTC yearly or prn     Relevant Orders    CBC and Auto Differential    Comprehensive Metabolic Panel    Hemoglobin A1C    Lipid Panel    TSH with reflex to Free T4 if abnormal    Chronic right shoulder pain      RF to PT  If no improvement xrays +/- ortho       Relevant Orders    Referral to Physical Therapy    Cervical lymphadenopathy      Check US     Relevant Orders    US lymph nodes    Prominent vein: did not appreciate on exam. If vision changes/returns would eval for temp arterities w sed rate/optho RF

## 2024-10-02 ENCOUNTER — APPOINTMENT (OUTPATIENT)
Dept: GASTROENTEROLOGY | Facility: CLINIC | Age: 59
End: 2024-10-02
Payer: COMMERCIAL

## 2024-10-10 ENCOUNTER — TREATMENT (OUTPATIENT)
Dept: PHYSICAL THERAPY | Facility: CLINIC | Age: 59
End: 2024-10-10
Payer: COMMERCIAL

## 2024-10-10 DIAGNOSIS — M62.89 MUSCLE TIGHTNESS: ICD-10-CM

## 2024-10-10 DIAGNOSIS — R29.3 ABNORMAL POSTURE: ICD-10-CM

## 2024-10-10 DIAGNOSIS — N39.3 SUI (STRESS URINARY INCONTINENCE, FEMALE): ICD-10-CM

## 2024-10-10 DIAGNOSIS — M62.81 MUSCLE WEAKNESS: ICD-10-CM

## 2024-10-10 PROCEDURE — 97112 NEUROMUSCULAR REEDUCATION: CPT | Mod: GP | Performed by: PHYSICAL THERAPIST

## 2024-10-10 PROCEDURE — 97535 SELF CARE MNGMENT TRAINING: CPT | Mod: GP | Performed by: PHYSICAL THERAPIST
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
## 2024-10-17 ENCOUNTER — HOSPITAL ENCOUNTER (OUTPATIENT)
Dept: RADIOLOGY | Facility: HOSPITAL | Age: 59
End: 2024-10-17
Payer: COMMERCIAL

## 2024-10-22 ENCOUNTER — HOSPITAL ENCOUNTER (OUTPATIENT)
Dept: RADIOLOGY | Facility: HOSPITAL | Age: 59
Discharge: HOME | End: 2024-10-22
Payer: COMMERCIAL

## 2024-10-22 DIAGNOSIS — R59.0 CERVICAL LYMPHADENOPATHY: ICD-10-CM

## 2024-10-22 PROCEDURE — 76770 US EXAM ABDO BACK WALL COMP: CPT

## 2024-10-25 ENCOUNTER — APPOINTMENT (OUTPATIENT)
Dept: PHYSICAL THERAPY | Facility: CLINIC | Age: 59
End: 2024-10-25
Payer: COMMERCIAL

## 2024-12-17 ENCOUNTER — DOCUMENTATION (OUTPATIENT)
Dept: PHYSICAL THERAPY | Facility: CLINIC | Age: 59
End: 2024-12-17
Payer: COMMERCIAL

## 2024-12-17 NOTE — PROGRESS NOTES
Physical Therapy    Discharge Summary    Name: Cori Adorno  MRN: [id number]  : 1965  Date: 24    Discharge Summary: PT    Discharge Information: Date of discharge 24    Rehab Discharge Reason: Failed to schedule and/or keep follow-up appointment(s)    Enid Stern, PT

## 2024-12-27 ENCOUNTER — APPOINTMENT (OUTPATIENT)
Dept: OBSTETRICS AND GYNECOLOGY | Facility: CLINIC | Age: 59
End: 2024-12-27
Payer: COMMERCIAL

## 2025-01-03 ENCOUNTER — APPOINTMENT (OUTPATIENT)
Dept: OBSTETRICS AND GYNECOLOGY | Facility: CLINIC | Age: 60
End: 2025-01-03
Payer: COMMERCIAL

## 2025-01-21 NOTE — PROGRESS NOTES
[facility]    CC: bloating, nausea and belching     HPI:    Cori Adorno is a 59 y.o. female with PMHx including duodenal diverticulum for which she was hospitalized in 11/2021 at [facility] for duodenal diverticulitis.    Interval History/History per chart review:    She has previously followed with Dr. Bentley, last seen 03/2023 who presented for follow up for her duodenal diverticulum.     This was found during a hospitalization November 19, 2021 for abdominal pain. On admission she underwent a CT scan (images unavailable) which showed virginie-duodenal inflammation and a rim-enhancing irregular fluid and air collection near the D2 duodenal segment which was concerning for a contained perforation of duodenal diverticuli. Patient underwent nonoperative management with IV antibiotics (Zosyn and Fluconazole), NG tube placement and pain control. On 11/20/2021 she underwent MRCP showing no biliary abnormalities but similar findings in the duodenum, consistent with those reported on the prior CT scan. On November 22, 2021 she underwent an upper GI barium study showing no extravasation of contrast. The patient was initially n.p.o. but diet was slowly advanced until she was able to tolerate soft solids without difficulty or pain. She was discharged on November 26, 2021, to complete a 2-week course of antibiotics of Augmentin and Diflucan. She did well since discharge, but continued to have some residual symptoms. She had follow-up appointment with Dr. House on 1/20/22 and at that time, reported ongoing pain in the epigastrium and back. A follow-up CT scan was obtained on 2/4/22 with findings of no acute pathology, including no biliary, pancreas or bowel abnormalities. Duodenal diverticulum was noted without diverticulitis or perforation.     Outpatient visit Dr Bentley March 2022: reported years of upper GI symptoms including heartburn, indigestion, abdominal bloating and early satiety, intermittent  epigastric/upper GI pain located under the ribs. Abdominal bloating was fairly well controlled with diet changes and small meals. She had occasional nausea but no dysphagia or odynophagia that she was taking zofran prn. She had an EGD in 2020 at [facility] that was reportedly normal. Started on protonix and tums for GERD sx with some improvement. Planned to proceed with EGD (April 2022 with findings of a widely patent Schatzki ring, small hiatal hernia, mild gastritis and non-bleeding, non-inflamed duodenal diverticulum), SIBO testing (negative), FODMAP diet    Outpatient visit Dr Bentley March 2023: presented with ongoing residual sx with new BRBPR (small amount, blood mixed into stools and no melena). Noted unremarkable colonoscopy 2020. Tried FODMAP diet and noted improvement but found tough to adhere to. Notes being a vegetarian for past 25 years. Differential for her ongoing symptoms included altered gut microbiome, altered motility, food intolerances/allergies, and IBS. Agreed ok to stop protonix, try simethicone for bloating, reattempt FODMAP with slow initiation of food, try lactose free diet.    Per the patient:    She presents here to re establish care reports continued daily sx of bloating, nausea and belching that occur throughout the day, multiple times throughout the day, with no rhyme or reason, or change in sx related to food consumption or bowel movements. Sx partly alleviated by eating small meals of carbohydrates like crackers and bread and gaviscon over the counter. She keeps dairy to a minimum which has helped, still vegetarian but avoids cabbage and brooccoli which also helps. She takes probiotics daily, simethecone as needed which helps. Additionally takes digestive enzymes as needed. She does have the right sided epigastric pain that occurs 1-2 times a week. Sx are not debilitating. She was on protonix previosuly after her hospitalization for a year, reports it helps w sx but Dr. Bentley tapered  off and she did not notice her sx worsening after.    She continues to have soft formed brown Bms at least once a day brown, these bms have no change to her sx.     GI ROS: negative for heartburn/regurgitation, dysphagia/odynophagia, diarrhea, constipation, melena, hematochezia, BRBPR, NSAID use, family hx of GI cancers, F/C, night sweats, unintentional weight loss     Endoscopic Hx:    Colonoscopy 06/02/2023:    - The examined portion of the ileum was normal. Biopsied. (Negative)  - One non-bleeding diverticula was found in the sigmoid colon.  - The exam of the colon was otherwise normal.  - There is no endoscopic evidence of bleeding, inflammation, mass, polyps, stenosis, ulcerations, colitis or angioectasia in the entire colon. Random biopsies were taken. (Negative)  - Small non-bleeding external hemorrhoids.    EGD 02/06/2023: widely patent Schatzki ring, small hiatal hernia, mild gastritis and non-bleeding, non-inflamed duodenal diverticulum. Biopsies negative for H pylori    EGD 4/21/2022:   Widely patent, non-obstructing and mild Schatzki   ring.  - Gastroesophageal flap valve classified as Hill Grade   II (minimal fold, loose to endoscope, hiatal hernia likely).  -Mild antral gastritis. Biopsied. (GASTRIC ANTRAL- AND OXYNTIC-TYPE MUCOSA WITH MILD REACTIVE EPITHELIAL CHANGE   AND FOCAL MILD CHRONIC INFLAMMATION), negative for h pylori, biopsies negative for evidence of celiac  - Non-bleeding duodenal diverticulum.  - Normal major papilla.  - Normal examined duodenum.     Past medical history:  Past Medical History:   Diagnosis Date    Abnormal findings on diagnostic imaging of other specified body structures 06/07/2022    Thickened endometrium    Pelvic and perineal pain 06/07/2022    Pelvic pain in female       Medications:    Current Outpatient Medications:     cholecalciferol (Vitamin D-3) 10 MCG (400 UNIT) tablet, Take 1 tablet (10 mcg) by mouth once daily., Disp: , Rfl:     multivitamin (Multiple  Vitamins) tablet, Take 1 tablet by mouth once daily., Disp: , Rfl:     ondansetron (Zofran) 8 mg tablet, Take 1 tablet (8 mg) by mouth every 8 hours if needed., Disp: , Rfl:     Allergies:  Allergies   Allergen Reactions    Codeine Unknown    Diphenhydramine Unknown     heart racing       Surgical history:  Past Surgical History:   Procedure Laterality Date    OTHER SURGICAL HISTORY  04/12/2022    Breast reconstruction    OTHER SURGICAL HISTORY  04/12/2022    Mastectomy bilateral    OTHER SURGICAL HISTORY  06/22/2022    Robotic-assisted total laparoscopic hysterectomy       Family history:  No family history on file.    Social history:  Social History     Tobacco Use    Smoking status: Never    Smokeless tobacco: Never   Substance Use Topics    Alcohol use: Never    Drug use: Never        Vitals:  There were no vitals filed for this visit.    Physical exam:  Constitutional: Well-developed female in no acute distress.  HEENT: NC/AT, sclera anicteric  Respiratory: CTAB. No wheezes, rales, or rhonchi. Normal respiratory effort.  Cardiovascular: RRR. No murmurs, gallops, or rubs.  Abdominal: Soft, nondistended, nontender to palpation. Bowel sounds present.   Neuro: AAOx3. CN II-XII grossly intact.   MSK: Moving extremities well  Skin: Warm, dry. No rashes or wounds.  Psych: Appropriate mood and affect.    Labs:  No results found for this or any previous visit (from the past 24 hours).    Imaging:  No results found.    Assessment and Plan:  Cori Adorno is a 59 y.o. female PMHx including duodenal diverticulum for which she was hospitalized in 11/2021 at [facility] for duodenal diverticulitis presenting to re establish care with GI and continued complaints of  bloating, nausea and belching concerning for dyspepsia like sx. Has had extensive GI workup in the past which has been negative, and she has noted improvement of her sx by dietary changes such as FODMAP diet. Not quite IBS given here given no relation of sx to her Bms,  essentially with normal bowel movement. Given she noted improvement with FODMAP, will refer to nutritionist to help refine her diet and identify triggers. Also reasonable to rx antispasmodic. Can consider TCA down the line if sx do not improve.    #Bloating  #Nausea  #C/f dyspepsia  -low FODMAP diet, referred to nutrition  -hyoscyamine 0.375 bid   -renewed zofran prn    #CRC Surveillance  -colonoscopy 6/2023 was normal, repeat 10 years, due in 2033    Follow-up in 3 months.    Patient and plan discussed with attending physician Dr. Darrel Graff MD  PGY-2 Internal Medicine

## 2025-01-22 ENCOUNTER — OFFICE VISIT (OUTPATIENT)
Dept: GASTROENTEROLOGY | Facility: CLINIC | Age: 60
End: 2025-01-22
Payer: COMMERCIAL

## 2025-01-22 VITALS
WEIGHT: 136 LBS | TEMPERATURE: 97.3 F | DIASTOLIC BLOOD PRESSURE: 84 MMHG | HEART RATE: 89 BPM | SYSTOLIC BLOOD PRESSURE: 138 MMHG | BODY MASS INDEX: 21.95 KG/M2

## 2025-01-22 DIAGNOSIS — R14.0 ABDOMINAL BLOATING: Primary | ICD-10-CM

## 2025-01-22 PROCEDURE — 99214 OFFICE O/P EST MOD 30 MIN: CPT | Performed by: INTERNAL MEDICINE

## 2025-01-22 RX ORDER — HYOSCYAMINE SULFATE 0.38 MG/1
0.38 TABLET, EXTENDED RELEASE ORAL EVERY 12 HOURS
Qty: 180 TABLET | Refills: 3 | Status: SHIPPED | OUTPATIENT
Start: 2025-01-22 | End: 2026-01-22

## 2025-01-22 RX ORDER — ONDANSETRON 4 MG/1
4 TABLET, ORALLY DISINTEGRATING ORAL EVERY 8 HOURS PRN
Qty: 20 TABLET | Refills: 11 | Status: SHIPPED | OUTPATIENT
Start: 2025-01-22 | End: 2025-01-29

## 2025-01-22 NOTE — PATIENT INSTRUCTIONS
As we discussed we will try you on an antispasmodic drug twice a day. I will also have you see the nutritionist about a low FODMAP diet with gradual reintroduction of foods. Follow up in 3 months.

## 2025-01-29 ENCOUNTER — PATIENT MESSAGE (OUTPATIENT)
Dept: CARE COORDINATION | Facility: CLINIC | Age: 60
End: 2025-01-29
Payer: COMMERCIAL

## 2025-01-29 ENCOUNTER — APPOINTMENT (OUTPATIENT)
Dept: OBSTETRICS AND GYNECOLOGY | Facility: CLINIC | Age: 60
End: 2025-01-29
Payer: COMMERCIAL

## 2025-02-07 ENCOUNTER — HOSPITAL ENCOUNTER (OUTPATIENT)
Dept: RADIOLOGY | Facility: CLINIC | Age: 60
Discharge: HOME | End: 2025-02-07
Payer: COMMERCIAL

## 2025-02-07 ENCOUNTER — TELEPHONE (OUTPATIENT)
Dept: PRIMARY CARE | Facility: CLINIC | Age: 60
End: 2025-02-07
Payer: COMMERCIAL

## 2025-02-07 DIAGNOSIS — E78.5 HYPERLIPIDEMIA, UNSPECIFIED HYPERLIPIDEMIA TYPE: ICD-10-CM

## 2025-02-07 DIAGNOSIS — E78.5 HYPERLIPIDEMIA, UNSPECIFIED HYPERLIPIDEMIA TYPE: Primary | ICD-10-CM

## 2025-02-07 PROCEDURE — 75571 CT HRT W/O DYE W/CA TEST: CPT

## 2025-02-07 NOTE — TELEPHONE ENCOUNTER
CAC is elevated in 260s. We should discuss this in more detail. Pls maria antonia f/up in the next few wks and have her rpt her cholesterol labs prior.

## 2025-02-14 LAB
ALBUMIN SERPL-MCNC: 4.5 G/DL (ref 3.6–5.1)
ALP SERPL-CCNC: 81 U/L (ref 37–153)
ALT SERPL-CCNC: 13 U/L (ref 6–29)
ANION GAP SERPL CALCULATED.4IONS-SCNC: 11 MMOL/L (CALC) (ref 7–17)
AST SERPL-CCNC: 22 U/L (ref 10–35)
BASOPHILS # BLD AUTO: 30 CELLS/UL (ref 0–200)
BASOPHILS NFR BLD AUTO: 0.7 %
BILIRUB SERPL-MCNC: 0.5 MG/DL (ref 0.2–1.2)
BUN SERPL-MCNC: 10 MG/DL (ref 7–25)
CALCIUM SERPL-MCNC: 9.6 MG/DL (ref 8.6–10.4)
CHLORIDE SERPL-SCNC: 99 MMOL/L (ref 98–110)
CHOLEST SERPL-MCNC: 216 MG/DL
CHOLEST/HDLC SERPL: 2.2 (CALC)
CO2 SERPL-SCNC: 31 MMOL/L (ref 20–32)
CREAT SERPL-MCNC: 0.66 MG/DL (ref 0.5–1.05)
EGFRCR SERPLBLD CKD-EPI 2021: 100 ML/MIN/1.73M2
EOSINOPHIL # BLD AUTO: 82 CELLS/UL (ref 15–500)
EOSINOPHIL NFR BLD AUTO: 1.9 %
ERYTHROCYTE [DISTWIDTH] IN BLOOD BY AUTOMATED COUNT: 12.4 % (ref 11–15)
EST. AVERAGE GLUCOSE BLD GHB EST-MCNC: 108 MG/DL
EST. AVERAGE GLUCOSE BLD GHB EST-SCNC: 6 MMOL/L
GLUCOSE SERPL-MCNC: 82 MG/DL (ref 65–99)
HBA1C MFR BLD: 5.4 % OF TOTAL HGB
HCT VFR BLD AUTO: 41.3 % (ref 35–45)
HDLC SERPL-MCNC: 99 MG/DL
HGB BLD-MCNC: 13.8 G/DL (ref 11.7–15.5)
LDLC SERPL CALC-MCNC: 94 MG/DL (CALC)
LYMPHOCYTES # BLD AUTO: 1281 CELLS/UL (ref 850–3900)
LYMPHOCYTES NFR BLD AUTO: 29.8 %
MCH RBC QN AUTO: 33.2 PG (ref 27–33)
MCHC RBC AUTO-ENTMCNC: 33.4 G/DL (ref 32–36)
MCV RBC AUTO: 99.3 FL (ref 80–100)
MONOCYTES # BLD AUTO: 323 CELLS/UL (ref 200–950)
MONOCYTES NFR BLD AUTO: 7.5 %
NEUTROPHILS # BLD AUTO: 2584 CELLS/UL (ref 1500–7800)
NEUTROPHILS NFR BLD AUTO: 60.1 %
NONHDLC SERPL-MCNC: 117 MG/DL (CALC)
PLATELET # BLD AUTO: 219 THOUSAND/UL (ref 140–400)
PMV BLD REES-ECKER: 10.8 FL (ref 7.5–12.5)
POTASSIUM SERPL-SCNC: 4.1 MMOL/L (ref 3.5–5.3)
PROT SERPL-MCNC: 7.1 G/DL (ref 6.1–8.1)
RBC # BLD AUTO: 4.16 MILLION/UL (ref 3.8–5.1)
SODIUM SERPL-SCNC: 141 MMOL/L (ref 135–146)
TRIGL SERPL-MCNC: 126 MG/DL
TSH SERPL-ACNC: 1.76 MIU/L (ref 0.4–4.5)
WBC # BLD AUTO: 4.3 THOUSAND/UL (ref 3.8–10.8)

## 2025-02-18 ENCOUNTER — APPOINTMENT (OUTPATIENT)
Dept: PRIMARY CARE | Facility: CLINIC | Age: 60
End: 2025-02-18
Payer: COMMERCIAL

## 2025-02-18 VITALS
RESPIRATION RATE: 16 BRPM | SYSTOLIC BLOOD PRESSURE: 126 MMHG | BODY MASS INDEX: 22.66 KG/M2 | TEMPERATURE: 97.4 F | DIASTOLIC BLOOD PRESSURE: 76 MMHG | HEART RATE: 77 BPM | OXYGEN SATURATION: 96 % | WEIGHT: 136 LBS | HEIGHT: 65 IN

## 2025-02-18 DIAGNOSIS — E78.5 HYPERLIPIDEMIA, UNSPECIFIED HYPERLIPIDEMIA TYPE: Primary | ICD-10-CM

## 2025-02-18 PROCEDURE — 3008F BODY MASS INDEX DOCD: CPT | Performed by: FAMILY MEDICINE

## 2025-02-18 PROCEDURE — 99214 OFFICE O/P EST MOD 30 MIN: CPT | Performed by: FAMILY MEDICINE

## 2025-02-18 PROCEDURE — 1036F TOBACCO NON-USER: CPT | Performed by: FAMILY MEDICINE

## 2025-02-18 RX ORDER — ATORVASTATIN CALCIUM 20 MG/1
20 TABLET, FILM COATED ORAL DAILY
Qty: 30 TABLET | Refills: 11 | Status: SHIPPED | OUTPATIENT
Start: 2025-02-18 | End: 2026-02-18

## 2025-02-18 ASSESSMENT — PATIENT HEALTH QUESTIONNAIRE - PHQ9
2. FEELING DOWN, DEPRESSED OR HOPELESS: NOT AT ALL
SUM OF ALL RESPONSES TO PHQ9 QUESTIONS 1 AND 2: 0
1. LITTLE INTEREST OR PLEASURE IN DOING THINGS: NOT AT ALL

## 2025-02-20 PROBLEM — R14.0 ABDOMINAL BLOATING: Status: ACTIVE | Noted: 2025-02-20

## 2025-02-20 NOTE — PROGRESS NOTES
Nutrition Initial Assessment:     Patient Cori Adorno is a 59 y.o. female being seen at [facility] who was referred by     Tucker Rojo MD    on 1/22/2025 for   1. Abdominal bloating  Referral to Nutrition Services          Nutrition Assessment    Patient Active Problem List   Diagnosis    Abdominal bloating       Nutrition History:  Food & Nutrition History: Pt reports having a  blockage in her heart and would like to go over a heart heatlhy diet.  Food Allergies:  (none);  Food Intolerances: none  Vitamin/mineral intake: D, B12, Multivitamin Calcium, Magnesium (krill oil, baby aspirin)  Herbal supplements:  none  Medication and Complementary/Alternative Medicine Use:  none  GI Symptoms:  (none)  Mouth Issues:  (none); Teeth Issues:  (none)  Sleep Habits: 5-6 hrs disrupted, 7+ hrs disrupted    Diet Recall:  Meal 1: 1030 am: oatmeal with friut (berries), or egg whites with spinach and mushrooms with low carb tortilla  Snack 1:    Meal 2: skip  Snack 2: 1pm sunflower seeds, nuts, cranberries or handful of almonds or friut  Meal 3: 4pm: roasted veggies with pasta  Snack 3:    Food Variety: Absent (missing enough protein)  Oral Nutrition Supplement Use:          none  Fluid Intake: water( 32 oz * 3 times) decaffiene iced latte  Energy Intake: Good > 75 %    Food Preparation:  Cooking: Patient  Grocery Shopping: Patient  Dining Out: 1 to 3 times a month    Physical Activity:   Pt reports joining the gym and getting back into a exercise routine.    Food Security/Insecurity:  none    Anthropometrics:                            Weight History:   Daily Weight  02/18/25 : 61.7 kg (136 lb)  01/22/25 : 61.7 kg (136 lb)  10/01/24 : 59.4 kg (131 lb)  08/22/24 : 61.2 kg (135 lb)  05/29/24 : 61.2 kg (134 lb 14.7 oz)  05/01/23 : 62.1 kg (137 lb)  03/01/23 : 59 kg (130 lb 1.6 oz)  11/15/22 : 58.1 kg (128 lb)  07/19/22 : 56.2 kg (124 lb)  06/22/22 : 54.1 kg (119 lb 4.3 oz)    Weight Change %: weight stable        Nutrition Focused Physical Exam Findings:  Subcutaneous Fat Loss:   Defer Subcutaneous Fat Loss Assessment: Defer all    Muscle Wasting:  Defer Muscle Wasting Assessment: Defer all    Physical Findings:  Hair: Negative  Eyes: Negative  Nails: Negative  Skin: Negative  Respiratory: Negative  Edema: none      Nutrition Significant Labs:  Last Chem:     Chemistry    Lab Results   Component Value Date/Time     02/13/2025 0838    K 4.1 02/13/2025 0838    CL 99 02/13/2025 0838    CO2 31 02/13/2025 0838    BUN 10 02/13/2025 0838    CREATININE 0.66 02/13/2025 0838    Lab Results   Component Value Date/Time    CALCIUM 9.6 02/13/2025 0838    ALKPHOS 81 02/13/2025 0838    AST 22 02/13/2025 0838    ALT 13 02/13/2025 0838    BILITOT 0.5 02/13/2025 0838       , CMP Trend:    Recent Labs     02/13/25  0838 05/01/24  0712 05/02/23  1132 01/27/22  1144 03/12/21  1204   GLUCOSE 82 98 94   < > 89    142 139   < > 142   K 4.1 4.6 4.6   < > 4.6   CL 99 101 100   < > 103   CO2 31 36* 30   < > 28   ANIONGAP 11 10 14   < > 11   BUN 10 9 12   < > 9   CREATININE 0.66 0.71 0.72   < > 0.6   EGFR 100 >90  --   --  110   CALCIUM 9.6 9.8 10.1   < > 9.5   ALBUMIN 4.5 4.3 4.6   < > 4.5   ALKPHOS 81 70 71   < > 72   PROT 7.1 7.0 7.3   < > 7.0   AST 22 19 28   < > 20   BILITOT 0.5 0.6 0.5   < > 0.4   ALT 13 13 21   < > 10    < > = values in this interval not displayed.   , CBC Trend:   Recent Labs     02/13/25  0838 05/01/24  0712 05/02/23  1132 05/02/22  0939 01/27/22  1144   WBC 4.3 5.2 5.5   < > 6.0   NRBC  --  0.0 0.0  --  0.0   RBC 4.16 4.17 4.30   < > 4.29   HGB 13.8 13.4 13.8   < > 13.6   HCT 41.3 39.8 41.9   < > 42.8   MCV 99.3 95 97   < > 100   MCH 33.2* 32.1  --   --   --    MCHC 33.4 33.7 32.9   < > 31.8*   RDW 12.4 12.0 12.3   < > 12.1    233 242   < > 216    < > = values in this interval not displayed.   , RFP + Serum Mag Trend:   Recent Labs     02/13/25  0838 05/01/24  0712 05/02/23  1132 01/27/22  1144  "03/12/21  1204   GLUCOSE 82 98 94   < > 89    142 139   < > 142   K 4.1 4.6 4.6   < > 4.6   CL 99 101 100   < > 103   CO2 31 36* 30   < > 28   ANIONGAP 11 10 14   < > 11   BUN 10 9 12   < > 9   CREATININE 0.66 0.71 0.72   < > 0.6   EGFR 100 >90  --   --  110   CALCIUM 9.6 9.8 10.1   < > 9.5   ALBUMIN 4.5 4.3 4.6   < > 4.5    < > = values in this interval not displayed.   , LFT Trend:   Recent Labs     02/13/25  0838 05/01/24  0712 05/02/23  1132 05/02/22  0939 01/27/22  1144   ALBUMIN 4.5 4.3 4.6   < > 4.8   BILITOT 0.5 0.6 0.5   < > 0.6   BILIDIR  --   --   --   --  0.1   ALKPHOS 81 70 71   < > 77   ALT 13 13 21   < > 10   AST 22 19 28   < > 18   PROT 7.1 7.0 7.3   < > 7.5    < > = values in this interval not displayed.   , DM Specific Labs Trend (includes HgbA1C):   Recent Labs     02/13/25  0838 05/01/24  0712 05/02/23  1132   HGBA1C 5.4 5.1 5.1   , Lipid Panel Trend:    Recent Labs     02/13/25  0838 05/01/24  0712 05/02/23  1132 05/02/22  0939 03/12/21  1204   CHOL 216* 219* 247* 229* 176   HDL 99 81.0 85.7 91.7 80   LDLCALC 94 112*  --   --  79   LDLF  --   --  112* 121*  --    VLDL  --  26 49* 17  --    TRIG 126 131 246* 84 84   , Iron Panel + Serum Ferritin Trend: No results for input(s): \"IRON\", \"UIBC\", \"TIBC\", \"IRONSAT\", \"FERRITIN\" in the last 11017 hours., Vitamin B12:   Lab Results   Component Value Date    XNHSVXMB24 394 05/02/2023    , Folate:   Lab Results   Component Value Date    FOLATE >24.0 05/02/2023    , and Vitamin D:   Lab Results   Component Value Date    VITD25 25 (A) 05/02/2022        Medications:  Current Outpatient Medications   Medication Instructions    atorvastatin (LIPITOR) 20 mg, oral, Daily    cholecalciferol (VITAMIN D-3) 400 Units, Daily    hyoscyamine ER (LEVBID) 0.375 mg, oral, Every 12 hours, Do not crush or chew.    multivitamin (Multiple Vitamins) tablet 1 tablet, Daily    ondansetron (ZOFRAN) 8 mg, Every 8 hours PRN        Estimated Needs:  We did not discuss needs " today.             Nutrition Diagnosis   Malnutrition Diagnosis  Patient has Malnutrition Diagnosis: No    Nutrition Diagnosis  Patient has Nutrition Diagnosis: Yes  Diagnosis Status (1): New  Nutrition Diagnosis 1: Food and nutrition related knowledge deficit  Related to (1): limited or lack of prior nutrition-related education  As Evidenced by (1): per pt report looking for guidance on healthy eating patterns, intake of unbalanced meals and snacks per diet recall       Nutrition Interventions/Recommendations   Nutrition Prescription: Oral nutrition General Healthy Diet    Nutrition Interventions:   Food and Nutrient Delivery: Meals & Snacks: Energy-modified diet, General Healthful Diet     Coordination of Care:   none    Nutrition Education:   Nutrition Education Content: Content related nutrition education  Balanced meals using plate method, timing of meals, adequate hydration, benefits of exercise    Nutrition Education Topics Discussed:   We discussed the importance of following a heart healthy which is a low saturated fat, low cholesterol, low sodium diet. Choose foods with less than 5 grams (g) of total fat per serving. For someone who needs to eat 2,000 calories per day, 50 g to 75 g per day is a good range. Try to pick foods with heart-healthy fats (monounsaturated and polyunsaturated fats). Choose foods with less than 2 g per serving of saturated fat and 0 g of trans fat. (Saturated fat and trans fat are not heart-healthy.) A person who needs to eat 2,000 calories per day should eat no more than 11 g to 15 g of saturated fat in one day. Read ingredients listed on the label. If a food contains partially hydrogenated oils, then it has trans fat. (If it has less than half a gram per serving, the label may still say trans fat-free.)     Provided Keys for a Healthy Lifestyle Handout. Discussed the following:  Start a daily exercise routine. Adults should target 150 minutes of moderate intensity exercise each  week. Start slow and work your way up to this amount. Provided examples of aerobic, resistance, and stretching/balance activities.  Plan balanced meals. The “Plate Method” is a tool used to plan balanced meals. Aim for the following:  One-third to half the plate (or the meal) should be a non-starchy vegetable. Non-starchy vegetables include broccoli, cauliflower, salad greens, carrots, cucumbers, asparagus, green beans, tomatoes, and many more.  One-third to a quarter of the plate should be a lean protein. Lean proteins include chicken, turkey, fish, lean beef, eggs, nuts, tofu.  One third to a quarter of the plate can be a complex starch or carbohydrate. Examples of complex starches / carbohydrates include starchy vegetables (potatoes, peas, corn, and butternut squash), grains (whole wheat bread, quinoa, and brown rice), legumes (lentils and beans), and fruit.  Olive oil should be the primary fat source used for cooking.  Use a 9-10 inch plate to help manage portions  Pre-plan meal ideas. Spending time planning upfront saves you from relying on convenience foods. It can also help you save money! Your plan does not need to be rigid, but you should have some idea of what meals you are going to make going into the week. Place this information on the refrigerator in plain sight. There are many products you can purchase to help keep you organized.   Stay hydrated with water or other lower calorie beverages. We often confuse our body's signal for thirst with being hungry. Make sure you are staying hydrated, preferably with water. The best indicator of hydration is your urine. It should be a pale yellow. Provided examples of sugar-free beverages and ways to flavor water.      Educational Handouts Provided: Heart heathy nutrition therapy     Nutrition Counseling:   Nutrition Counseling Strategies : Nutrition counseling based on motivational interviewing strategy, Nutrition counseling based on goal setting  strategy    Patient Goals:  To eat 3 well-balanced meals per day.     Readiness to Change : Good  Level of Understanding : Good  Anticipated Compliant : Good         Nutrition Monitoring and Evaluation   Food and Nutrient Intake  Monitoring and Evaluation Plan: Meal/snack pattern, Protein intake  Meal/Snack Pattern: Estimated meal and snack pattern  Criteria: Monitor patient's progress towards meal and snack goal(s)                               Follow Up: Patient declined nutrition follow up appointment. This service will remain available if patient wishes to schedule a follow up. Referral is good for 1 year (expires on 1/22/2026). If patient wishes to schedule a follow up, can call Nutrition Scheduling Line at [phone].

## 2025-02-21 ENCOUNTER — NUTRITION (OUTPATIENT)
Dept: NUTRITION | Facility: HOSPITAL | Age: 60
End: 2025-02-21
Payer: COMMERCIAL

## 2025-02-21 DIAGNOSIS — R14.0 ABDOMINAL BLOATING: Primary | ICD-10-CM

## 2025-02-21 PROCEDURE — 97802 MEDICAL NUTRITION INDIV IN: CPT

## 2025-02-26 PROCEDURE — 88305 TISSUE EXAM BY PATHOLOGIST: CPT | Performed by: DERMATOLOGY

## 2025-02-27 ENCOUNTER — APPOINTMENT (OUTPATIENT)
Dept: PRIMARY CARE | Facility: CLINIC | Age: 60
End: 2025-02-27
Payer: COMMERCIAL

## 2025-02-28 ENCOUNTER — LAB REQUISITION (OUTPATIENT)
Dept: DERMATOPATHOLOGY | Facility: CLINIC | Age: 60
End: 2025-02-28
Payer: COMMERCIAL

## 2025-02-28 DIAGNOSIS — D48.5 NEOPLASM OF UNCERTAIN BEHAVIOR OF SKIN: ICD-10-CM

## 2025-02-28 DIAGNOSIS — L53.8 OTHER SPECIFIED ERYTHEMATOUS CONDITIONS: ICD-10-CM

## 2025-03-07 ENCOUNTER — APPOINTMENT (OUTPATIENT)
Dept: OBSTETRICS AND GYNECOLOGY | Facility: CLINIC | Age: 60
End: 2025-03-07
Payer: COMMERCIAL

## 2025-03-21 PROBLEM — G47.30 SLEEP APNEA: Status: ACTIVE | Noted: 2021-09-13

## 2025-03-21 PROBLEM — K21.9 GASTROESOPHAGEAL REFLUX DISEASE: Status: ACTIVE | Noted: 2025-03-21

## 2025-03-26 ENCOUNTER — OFFICE VISIT (OUTPATIENT)
Dept: CARDIOLOGY | Facility: CLINIC | Age: 60
End: 2025-03-26
Payer: COMMERCIAL

## 2025-03-26 VITALS
WEIGHT: 138.4 LBS | HEART RATE: 66 BPM | SYSTOLIC BLOOD PRESSURE: 139 MMHG | BODY MASS INDEX: 22.85 KG/M2 | DIASTOLIC BLOOD PRESSURE: 81 MMHG | OXYGEN SATURATION: 98 %

## 2025-03-26 DIAGNOSIS — R93.1 ELEVATED CORONARY ARTERY CALCIUM SCORE: Primary | ICD-10-CM

## 2025-03-26 DIAGNOSIS — R03.0 ELEVATED BLOOD-PRESSURE READING WITHOUT DIAGNOSIS OF HYPERTENSION: ICD-10-CM

## 2025-03-26 DIAGNOSIS — E78.5 HYPERLIPIDEMIA, UNSPECIFIED HYPERLIPIDEMIA TYPE: ICD-10-CM

## 2025-03-26 LAB
ATRIAL RATE: 67 BPM
P AXIS: 34 DEGREES
P OFFSET: 207 MS
P ONSET: 161 MS
PR INTERVAL: 120 MS
Q ONSET: 221 MS
QRS COUNT: 11 BEATS
QRS DURATION: 84 MS
QT INTERVAL: 420 MS
QTC CALCULATION(BAZETT): 443 MS
QTC FREDERICIA: 436 MS
R AXIS: 87 DEGREES
T AXIS: 76 DEGREES
T OFFSET: 431 MS
VENTRICULAR RATE: 67 BPM

## 2025-03-26 PROCEDURE — 99214 OFFICE O/P EST MOD 30 MIN: CPT | Performed by: INTERNAL MEDICINE

## 2025-03-26 PROCEDURE — 1036F TOBACCO NON-USER: CPT | Performed by: INTERNAL MEDICINE

## 2025-03-26 PROCEDURE — 93005 ELECTROCARDIOGRAM TRACING: CPT | Performed by: INTERNAL MEDICINE

## 2025-03-26 PROCEDURE — 99204 OFFICE O/P NEW MOD 45 MIN: CPT | Performed by: INTERNAL MEDICINE

## 2025-03-26 RX ORDER — ASPIRIN 81 MG/1
81 TABLET ORAL DAILY
COMMUNITY

## 2025-03-26 ASSESSMENT — ENCOUNTER SYMPTOMS
PSYCHIATRIC NEGATIVE: 1
OCCASIONAL FEELINGS OF UNSTEADINESS: 0
HEMATOLOGIC/LYMPHATIC NEGATIVE: 1
DEPRESSION: 0
LOSS OF SENSATION IN FEET: 0
RESPIRATORY NEGATIVE: 1
GASTROINTESTINAL NEGATIVE: 1
EYES NEGATIVE: 1
ENDOCRINE NEGATIVE: 1
ALLERGIC/IMMUNOLOGIC NEGATIVE: 1
MUSCULOSKELETAL NEGATIVE: 1
CARDIOVASCULAR NEGATIVE: 1
NEUROLOGICAL NEGATIVE: 1
CONSTITUTIONAL NEGATIVE: 1

## 2025-03-26 ASSESSMENT — COLUMBIA-SUICIDE SEVERITY RATING SCALE - C-SSRS
6. HAVE YOU EVER DONE ANYTHING, STARTED TO DO ANYTHING, OR PREPARED TO DO ANYTHING TO END YOUR LIFE?: NO
1. IN THE PAST MONTH, HAVE YOU WISHED YOU WERE DEAD OR WISHED YOU COULD GO TO SLEEP AND NOT WAKE UP?: NO
2. HAVE YOU ACTUALLY HAD ANY THOUGHTS OF KILLING YOURSELF?: NO

## 2025-03-26 ASSESSMENT — PATIENT HEALTH QUESTIONNAIRE - PHQ9
SUM OF ALL RESPONSES TO PHQ9 QUESTIONS 1 AND 2: 0
2. FEELING DOWN, DEPRESSED OR HOPELESS: NOT AT ALL
1. LITTLE INTEREST OR PLEASURE IN DOING THINGS: NOT AT ALL

## 2025-03-26 ASSESSMENT — PAIN SCALES - GENERAL: PAINLEVEL_OUTOF10: 0-NO PAIN

## 2025-03-26 NOTE — PROGRESS NOTES
Preventive Cardiology Clinic Note    Reason for Visit: Elevated coronary calcium score  Referring Clinician: Behm     History of Present Illness: Cori Adorno is a 59 y.o. female with hyperlipidemia, elevated blood pressure without diagnosis of hypertension, and elevated coronary artery calcium score who is referred by her PCP for cardiovascular risk discussion.  She leads an active lifestyle and does not have a personal history of cardiovascular disease.  She does not report any exertional chest pain, shortness of breath, palpitations, or syncope.  She exercises regularly without cardiopulmonary limitation.  She is a non-smoker.  She does have a family history of cardiovascular disease as her father had valvular disease and her mother had coronary artery disease.  Her brother also recently had a stroke.  Once her calcium score came back her PCP started her on 20 mg of atorvastatin and low-dose aspirin which she is tolerating well without any adverse effects.  She is due for repeat lipid panel in April.    Past Medical History:  She has a past medical history of Abnormal findings on diagnostic imaging of other specified body structures (06/07/2022) and Pelvic and perineal pain (06/07/2022).    Past Surgical History:  She has a past surgical history that includes Other surgical history (04/12/2022); Other surgical history (04/12/2022); and Other surgical history (06/22/2022).    Social History:  She reports that she has never smoked. She has never used smokeless tobacco. She reports current alcohol use. She reports that she does not use drugs.    Family History:  No family history on file.    Allergies:  Codeine and Diphenhydramine    Outpatient Medications:  Current Outpatient Medications   Medication Instructions    aspirin 81 mg, Daily    atorvastatin (LIPITOR) 20 mg, oral, Daily    cholecalciferol (VITAMIN D-3) 400 Units, Daily    hyoscyamine ER (LEVBID) 0.375 mg, oral, Every 12 hours, Do not crush or chew.     multivitamin (Multiple Vitamins) tablet 1 tablet, Daily    ondansetron (ZOFRAN) 8 mg, Every 8 hours PRN       Review of Systems:  Review of Systems   Constitutional: Negative.   HENT: Negative.     Eyes: Negative.    Cardiovascular: Negative.    Respiratory: Negative.     Endocrine: Negative.    Hematologic/Lymphatic: Negative.    Skin: Negative.    Musculoskeletal: Negative.    Gastrointestinal: Negative.    Genitourinary: Negative.    Neurological: Negative.    Psychiatric/Behavioral: Negative.     Allergic/Immunologic: Negative.        Last Recorded Vitals:  Vitals:    03/26/25 0806 03/26/25 0808   BP: 146/83 139/81   BP Location: Right arm Left arm   Patient Position: Sitting Sitting   BP Cuff Size: Adult Adult   Pulse: 66    SpO2: 98%    Weight: 62.8 kg (138 lb 6.4 oz)        Physical Examination:  General: Well appearing, well-nourished, in no acute distress.  HEENT: Normocephalic atraumatic, pupils equal and reactive to light, extraocular muscles intact, no conjunctival injection, oropharynx clear without exudates.  Neck: Normal carotid arterial pulses, no arterial bruits, no thyromegaly.  Cardiac: Regular rhythm and normal heart rate.  S1, S2 present and normal.  No murmurs, rubs, or gallops.  PMI is nondisplaced. Jugular venous pulsations are normal.  Pulmonary: Normal breath sounds, no increased work of breathing, no wheezes or crackles.  GI: Normal bowel sounds, abdominal aorta not enlarged, no hepatosplenomegaly, no abdominal bruits.  Lower extremities: No cyanosis, clubbing, or edema.  No xanthelasma present. Normal distal pulses.  Skin: Skin intact. No significant rashes or lesions present.  Neuro: Alert and oriented x 3, normal attention and cognition, no focal motor or sensory neurologic deficits.  Psych: Normal affect and mood.  Musculoskeletal: Normal gait normal muscle tone.    Laboratory Studies:  Lab Results   Component Value Date    GLUCOSE 82 02/13/2025    CALCIUM 9.6 02/13/2025      "02/13/2025    K 4.1 02/13/2025    CO2 31 02/13/2025    CL 99 02/13/2025    BUN 10 02/13/2025    CREATININE 0.66 02/13/2025     Lab Results   Component Value Date    ALT 13 02/13/2025    AST 22 02/13/2025    ALKPHOS 81 02/13/2025    BILITOT 0.5 02/13/2025         Lab Results   Component Value Date    CHOL 216 (H) 02/13/2025    CHOL 219 (H) 05/01/2024    CHOL 247 (H) 05/02/2023     Lab Results   Component Value Date    HDL 99 02/13/2025    HDL 81.0 05/01/2024    HDL 85.7 05/02/2023     Lab Results   Component Value Date    LDLCALC 94 02/13/2025    LDLCALC 112 (H) 05/01/2024    LDLCALC 79 03/12/2021     Lab Results   Component Value Date    TRIG 126 02/13/2025    TRIG 131 05/01/2024    TRIG 246 (H) 05/02/2023     No components found for: \"CHOLHDL\"  Lab Results   Component Value Date    HGBA1C 5.4 02/13/2025     Cardiology Tests:  ECG:3/26/2025  ECG in the office today shows normal sinus rhythm, ventricular rate 67 bpm normal intervals and axis, normal ECG.    Cardiac Imaging:  CT cardiac scoring wo IV contrast 02/07/2025  LM 0,  .54,  LCx 0,  RCA 0,  Total 265.54    The 10-year ASCVD risk score (Cecile NEWBERRY, et al., 2019) is: 2.8%    Values used to calculate the score:      Age: 59 years      Sex: Female      Is Non- : No      Diabetic: No      Tobacco smoker: No      Systolic Blood Pressure: 139 mmHg      Is BP treated: No      HDL Cholesterol: 99 mg/dL      Total Cholesterol: 216 mg/dL    Assessment and Plan:  Problem List Items Addressed This Visit    None  Visit Diagnoses       Elevated coronary artery calcium score    -  Primary    Hyperlipidemia, unspecified hyperlipidemia type        Relevant Orders    ECG 12 Lead    Elevated blood-pressure reading without diagnosis of hypertension              Cori Adorno is a 59 y.o. female with hyperlipidemia, elevated blood pressure without diagnosis of hypertension, and elevated coronary artery calcium score who is referred by her PCP for " cardiovascular risk discussion.  She is asymptomatic from a cardiovascular standpoint with generally well-controlled risk factors and low to intermediate 10-year ASCVD risk.  However her elevated coronary artery calcium score is associated with higher risk so I do recommend and agree with statin therapy plus aspirin with a goal LDL cholesterol less than 70 mg/dL which I think she will achieve with her current dose of statin at the next time lipids are checked.  We discussed the risks and benefits of the medication including low-dose aspirin given her duodenal diverticulum history but she is tolerating this well and would recommend continuing as long as there are no adverse effects.  We discussed that since she is asymptomatic there is no indication for additional cardiovascular testing such as stress testing at this time.  We also discussed monitoring her blood pressure as it is elevated in the office today but likely related to stress due to her brother's current hospitalization.  I recommend getting home ambulatory blood pressure cuff and monitoring it with a goal BP less than 130/80 mmHg.  She will continue to follow-up with her PCP and I will be happy to see her back should any new cardiovascular issues arise.  Please do not hesitate to contact me with any questions or concerns.    Parish Castelan MD, FAHA, FACC  Director,  [facility]  Director,  [facility] Program  Associate Professor of Medicine  [employer]

## 2025-04-18 DIAGNOSIS — E78.5 HYPERLIPIDEMIA, UNSPECIFIED HYPERLIPIDEMIA TYPE: ICD-10-CM

## 2025-04-23 ENCOUNTER — OFFICE VISIT (OUTPATIENT)
Dept: GASTROENTEROLOGY | Facility: CLINIC | Age: 60
End: 2025-04-23
Payer: COMMERCIAL

## 2025-04-23 VITALS
HEART RATE: 77 BPM | TEMPERATURE: 98.1 F | HEIGHT: 65 IN | BODY MASS INDEX: 21.83 KG/M2 | SYSTOLIC BLOOD PRESSURE: 92 MMHG | DIASTOLIC BLOOD PRESSURE: 59 MMHG | WEIGHT: 131 LBS

## 2025-04-23 DIAGNOSIS — R14.0 ABDOMINAL BLOATING: ICD-10-CM

## 2025-04-23 PROCEDURE — 99213 OFFICE O/P EST LOW 20 MIN: CPT | Performed by: INTERNAL MEDICINE

## 2025-04-23 PROCEDURE — 3008F BODY MASS INDEX DOCD: CPT | Performed by: INTERNAL MEDICINE

## 2025-04-23 NOTE — PATIENT INSTRUCTIONS
Glad that you're doing well. You should continue with dietary measures and hyoscyamine. Repeat colonoscopy in 6/28. Follow up in 6 months.

## 2025-04-25 ENCOUNTER — OFFICE VISIT (OUTPATIENT)
Dept: OBSTETRICS AND GYNECOLOGY | Facility: CLINIC | Age: 60
End: 2025-04-25
Payer: COMMERCIAL

## 2025-04-25 VITALS
HEIGHT: 66 IN | WEIGHT: 132.6 LBS | SYSTOLIC BLOOD PRESSURE: 107 MMHG | BODY MASS INDEX: 21.31 KG/M2 | HEART RATE: 70 BPM | DIASTOLIC BLOOD PRESSURE: 68 MMHG

## 2025-04-25 DIAGNOSIS — N32.81 OAB (OVERACTIVE BLADDER): Primary | ICD-10-CM

## 2025-04-25 DIAGNOSIS — Z90.710 HISTORY OF HYSTERECTOMY: ICD-10-CM

## 2025-04-25 DIAGNOSIS — N39.3 SUI (STRESS URINARY INCONTINENCE, FEMALE): ICD-10-CM

## 2025-04-25 LAB
POC APPEARANCE, URINE: CLEAR
POC BILIRUBIN, URINE: NEGATIVE
POC BLOOD, URINE: NEGATIVE
POC COLOR, URINE: YELLOW
POC GLUCOSE, URINE: NEGATIVE MG/DL
POC KETONES, URINE: NEGATIVE MG/DL
POC LEUKOCYTES, URINE: NEGATIVE
POC NITRITE,URINE: NEGATIVE
POC PH, URINE: 7 PH
POC PROTEIN, URINE: NEGATIVE MG/DL
POC SPECIFIC GRAVITY, URINE: 1.02
POC UROBILINOGEN, URINE: 0.2 EU/DL

## 2025-04-25 PROCEDURE — 3008F BODY MASS INDEX DOCD: CPT | Performed by: OBSTETRICS & GYNECOLOGY

## 2025-04-25 PROCEDURE — 99214 OFFICE O/P EST MOD 30 MIN: CPT | Performed by: OBSTETRICS & GYNECOLOGY

## 2025-04-25 PROCEDURE — 81003 URINALYSIS AUTO W/O SCOPE: CPT | Mod: QW | Performed by: OBSTETRICS & GYNECOLOGY

## 2025-04-25 ASSESSMENT — PAIN SCALES - GENERAL: PAINLEVEL_OUTOF10: 0-NO PAIN

## 2025-04-26 NOTE — PROGRESS NOTES
[facility] Department of Urogynecology   Charisse Chavez MD, MPH   [phone]    ASSESSMENT AND PLAN:   59 y.o. female being assessed for JOEL with urge and stress.    Diagnoses:   #1 JOEL with urge and stress     Plan:   1. UUI   - She's had notable sx relief in her UUI since she has worked on lifestyle changes and PFPT.   - Nocturia has resolved.   - Continue with behavioral modifications and PFPT exercises.     2. MALCOLM  - She denies significant sx relief in MALCOLM after attending PFPT.   - We discussed options for stress urinary incontinence including pessary use, urethral bulking and MUS placement.   - Patient plans to note how often her MALCOLM is occurring and will then decide if she wants to proceed with further MALCOLM tx. She will complete a bladder diary.     3. General GYN care   - S/p 2022 [facility].   - As she denies hx of abnormal pap, she is not indicated to undergo any further pap testing.   - She should still have an annual pelvic exam however.       Follow-up in 6 months with Dr. Chavez.     Scribe Attestation:   ILuda, am scribing for virtually, and in the presence of Charisse Chavez MD MPH on 4/26/25 at 10:59 AM.     Problem List Items Addressed This Visit    None  Visit Diagnoses         MALCOLM (stress urinary incontinence, female)        Relevant Orders    POCT UA Automated manually resulted (Completed)           I spent a total of *** minutes in face to face and non face to face time.      Charisse Chavez MD, MPH, FACOG     Established    HISTORY OF PRESENT ILLNESS:     Cori Adorno is a 59 y.o. female who presents for follow up on urinary incontinence.      Record Review:   - 8/22/24 Dr. Chavez note: MALCOLM, nocturia - Gave her lifestyle changes and referred to PFPT. Hx of UTI - Discussed E2, but patient declined. Put in standing order for urine cx. Discussed possible IC diagnosis.   - 4/25/2024 Urine culture: >100,000 E. Coli      Urinary Symptoms:   - She has worked on PFPT and  "lifestyle changes, which has improved her urinary urge symptoms. Patient says she used to always feel like she had a UTI before PFPT. She previously would rush to the bathroom and drank excessive water.   - Patient denies sx improvement in her MALCOLM after doing PFPT.   - She has started working out more, but denies she has an increase in MALCOLM. She is careful to do regular voids while working out.   - Since she reduced her water intake, she only gets up 0-1x/night.     OBGYN History:   - S/p 2022 [facility].   - Denies hx of abnormal pap.        Past Medical History:     Medical History[1]       Past Surgical History:     Surgical History[2]      Medications:     Prior to Admission medications    Medication Sig Start Date End Date Taking? Authorizing Provider   aspirin 81 mg EC tablet Take 1 tablet (81 mg) by mouth once daily.   Yes Historical Provider, MD   atorvastatin (Lipitor) 20 mg tablet Take 1 tablet (20 mg) by mouth once daily. 2/18/25 2/18/26 Yes Brittany Behm, DO   cholecalciferol (Vitamin D-3) 10 MCG (400 UNIT) tablet Take 1 tablet (400 Units) by mouth once daily.   Yes Historical Provider, MD   hyoscyamine ER (Levbid) 0.375 mg 12 hr tablet Take 1 tablet (0.375 mg) by mouth every 12 hours. Do not crush or chew. 1/22/25 1/22/26 Yes Tucker Rojo MD   multivitamin (Multiple Vitamins) tablet Take 1 tablet by mouth once daily.   Yes Historical Provider, MD   ondansetron (Zofran) 8 mg tablet Take 1 tablet (8 mg) by mouth every 8 hours if needed. 6/22/22  Yes Historical Provider, MD [place]  Review of Systems       PHYSICAL EXAM:    /68 (Patient Position: Sitting)   Pulse 70   Ht 1.676 m (5' 6\")   Wt 60.1 kg (132 lb 9.6 oz)   BMI 21.40 kg/m²   No LMP recorded. Patient is postmenopausal.      Well developed, well nourished, in no apparent distress.   Neurologic/Psychiatric:  Awake, Alert and Oriented times 3.  Affect normal.       Data and DIAGNOSTIC STUDIES REVIEWED   Imaging  No results " found.    Cardiology, Vascular, and Other Imaging  No other imaging results found for the past 7 days     Lab Results   Component Value Date    URINECULTURE >100,000 Escherichia coli (A) 04/25/2024      Lab Results   Component Value Date    GLUCOSE 82 02/13/2025    CALCIUM 9.6 02/13/2025     02/13/2025    K 4.1 02/13/2025    CO2 31 02/13/2025    CL 99 02/13/2025    BUN 10 02/13/2025    CREATININE 0.66 02/13/2025     Lab Results   Component Value Date    WBC 4.3 02/13/2025    HGB 13.8 02/13/2025    HCT 41.3 02/13/2025    MCV 99.3 02/13/2025     02/13/2025             [1]   Past Medical History:  Diagnosis Date    Abnormal findings on diagnostic imaging of other specified body structures 06/07/2022    Thickened endometrium    Pelvic and perineal pain 06/07/2022    Pelvic pain in female   [2]   Past Surgical History:  Procedure Laterality Date    OTHER SURGICAL HISTORY  04/12/2022    Breast reconstruction    OTHER SURGICAL HISTORY  04/12/2022    Mastectomy bilateral    OTHER SURGICAL HISTORY  06/22/2022    Robotic-assisted total laparoscopic hysterectomy

## 2025-04-28 NOTE — PATIENT INSTRUCTIONS
We discussed lifestyle changes includin) Aiming to drink around 60 oz total of fluids per day   2) Avoiding bladder irritants such as caffeine, nicotine, artificial sweeteners   3) Stop drinking fluids 3 hours before bedtime   4) Timed voids every 2-3 hours.      Please call the office with any questions or concerns.   [phone]

## 2025-05-13 LAB
ALBUMIN SERPL-MCNC: 4.4 G/DL (ref 3.6–5.1)
ALBUMIN/GLOB SERPL: 2.1 (CALC) (ref 1–2.5)
ALP SERPL-CCNC: 78 U/L (ref 37–153)
ALT SERPL-CCNC: 17 U/L (ref 6–29)
AST SERPL-CCNC: 25 U/L (ref 10–35)
BILIRUB DIRECT SERPL-MCNC: 0.1 MG/DL
BILIRUB INDIRECT SERPL-MCNC: 0.3 MG/DL (CALC) (ref 0.2–1.2)
BILIRUB SERPL-MCNC: 0.4 MG/DL (ref 0.2–1.2)
CHOLEST SERPL-MCNC: 148 MG/DL
CHOLEST/HDLC SERPL: 1.8 (CALC)
GLOBULIN SER CALC-MCNC: 2.1 G/DL (CALC) (ref 1.9–3.7)
HDLC SERPL-MCNC: 81 MG/DL
LDLC SERPL CALC-MCNC: 48 MG/DL (CALC)
NONHDLC SERPL-MCNC: 67 MG/DL (CALC)
PROT SERPL-MCNC: 6.5 G/DL (ref 6.1–8.1)
TRIGL SERPL-MCNC: 102 MG/DL

## 2025-05-20 ENCOUNTER — APPOINTMENT (OUTPATIENT)
Dept: PRIMARY CARE | Facility: CLINIC | Age: 60
End: 2025-05-20
Payer: COMMERCIAL

## 2025-05-20 VITALS
HEIGHT: 66 IN | WEIGHT: 134.8 LBS | DIASTOLIC BLOOD PRESSURE: 74 MMHG | HEART RATE: 72 BPM | TEMPERATURE: 98.4 F | SYSTOLIC BLOOD PRESSURE: 112 MMHG | RESPIRATION RATE: 16 BRPM | BODY MASS INDEX: 21.66 KG/M2 | OXYGEN SATURATION: 98 %

## 2025-05-20 DIAGNOSIS — L72.0 EPIDERMAL CYST OF FACE: ICD-10-CM

## 2025-05-20 DIAGNOSIS — E78.5 HYPERLIPIDEMIA, UNSPECIFIED HYPERLIPIDEMIA TYPE: Primary | ICD-10-CM

## 2025-05-20 DIAGNOSIS — Z00.00 ENCOUNTER FOR ANNUAL PHYSICAL EXAM: ICD-10-CM

## 2025-05-20 PROCEDURE — 99213 OFFICE O/P EST LOW 20 MIN: CPT | Performed by: FAMILY MEDICINE

## 2025-05-20 PROCEDURE — 3008F BODY MASS INDEX DOCD: CPT | Performed by: FAMILY MEDICINE

## 2025-05-20 PROCEDURE — 1036F TOBACCO NON-USER: CPT | Performed by: FAMILY MEDICINE

## 2025-05-20 RX ORDER — MAGNESIUM 200 MG
200 TABLET ORAL
COMMUNITY

## 2025-05-20 ASSESSMENT — PATIENT HEALTH QUESTIONNAIRE - PHQ9
1. LITTLE INTEREST OR PLEASURE IN DOING THINGS: NOT AT ALL
SUM OF ALL RESPONSES TO PHQ9 QUESTIONS 1 AND 2: 0
2. FEELING DOWN, DEPRESSED OR HOPELESS: NOT AT ALL

## 2025-05-20 NOTE — PROGRESS NOTES
"Subjective   Cori Adorno is a 60 y.o. female who presents for Follow-up and Mass (Small lump near temporal on left side of face).  HPI    F/up on cholesterol. CAC in 200s LAD. Saw cards x 2. started atorvastatin 20 mg.     L temporal bump. Started years ago and slightly larger.       Medications Ordered Prior to Encounter[1]               Objective   /74   Pulse 72   Temp 36.9 °C (98.4 °F)   Resp 16   Ht 1.676 m (5' 6\")   Wt 61.1 kg (134 lb 12.8 oz)   SpO2 98%   BMI 21.76 kg/m²    Physical Exam  General: NAD  HEENT:NCAT, PERRLA, nml OP. L temporal cyst soft mobile circular structure   Neck: no cervical DIANN  Heart: RRR no murmur, no edema   Lungs: CTA b/l, no wheeze or rhonchi   MSK: no c/c/e. nml gait   Skin: warm and dry  Psych: cooperative, appropriate affect  Neuro: speech clear. A&Ox3  Assessment/Plan   Problem List Items Addressed This Visit    None  Visit Diagnoses         Hyperlipidemia, unspecified hyperlipidemia type    -  Primary    Lipids at goal  Cont lipitor 20   LDL in 40s  Goal <70 as CAC in the 260s.   Cont asa 81   Cont TLC   F/up CPE oct labs prior                           Epidermal cyst of face      Rec f/up w derm, she will call                    [1]   Current Outpatient Medications on File Prior to Visit   Medication Sig Dispense Refill    aspirin 81 mg EC tablet Take 1 tablet (81 mg) by mouth once daily.      atorvastatin (Lipitor) 20 mg tablet Take 1 tablet (20 mg) by mouth once daily. 30 tablet 11    cholecalciferol (Vitamin D-3) 10 MCG (400 UNIT) tablet Take 1 tablet (400 Units) by mouth once daily.      hyoscyamine ER (Levbid) 0.375 mg 12 hr tablet Take 1 tablet (0.375 mg) by mouth every 12 hours. Do not crush or chew. 180 tablet 3    magnesium 200 mg tablet Take 1 tablet (200 mg) by mouth once daily.      multivitamin (Multiple Vitamins) tablet Take 1 tablet by mouth once daily.      ondansetron (Zofran) 8 mg tablet Take 1 tablet (8 mg) by mouth every 8 hours if needed.   "     No current facility-administered medications on file prior to visit.

## 2025-08-19 DIAGNOSIS — Z12.31 ENCOUNTER FOR SCREENING MAMMOGRAM FOR BREAST CANCER: ICD-10-CM

## 2025-09-03 ENCOUNTER — TELEPHONE (OUTPATIENT)
Dept: PRIMARY CARE | Facility: CLINIC | Age: 60
End: 2025-09-03
Payer: COMMERCIAL

## 2025-09-03 DIAGNOSIS — R30.0 DYSURIA: Primary | ICD-10-CM

## 2025-09-04 LAB
APPEARANCE UR: CLEAR
BACTERIA UR CULT: NORMAL
BILIRUB UR QL STRIP: NEGATIVE
COLOR UR: YELLOW
GLUCOSE UR QL STRIP: NEGATIVE
HGB UR QL STRIP: NEGATIVE
KETONES UR QL STRIP: NEGATIVE
LEUKOCYTE ESTERASE UR QL STRIP: NEGATIVE
NITRITE UR QL STRIP: NEGATIVE
PH UR STRIP: 7.5 [PH] (ref 5–8)
PROT UR QL STRIP: NEGATIVE
SP GR UR STRIP: 1.01 (ref 1–1.03)

## 2025-10-02 ENCOUNTER — APPOINTMENT (OUTPATIENT)
Dept: PRIMARY CARE | Facility: CLINIC | Age: 60
End: 2025-10-02
Payer: COMMERCIAL

## 2025-10-03 ENCOUNTER — APPOINTMENT (OUTPATIENT)
Dept: OBSTETRICS AND GYNECOLOGY | Facility: CLINIC | Age: 60
End: 2025-10-03
Payer: COMMERCIAL

## 2025-10-22 ENCOUNTER — APPOINTMENT (OUTPATIENT)
Dept: GASTROENTEROLOGY | Facility: CLINIC | Age: 60
End: 2025-10-22
Payer: COMMERCIAL

## 2025-10-24 ENCOUNTER — APPOINTMENT (OUTPATIENT)
Dept: OBSTETRICS AND GYNECOLOGY | Facility: CLINIC | Age: 60
End: 2025-10-24
Payer: COMMERCIAL

## 2025-11-07 ENCOUNTER — APPOINTMENT (OUTPATIENT)
Dept: GASTROENTEROLOGY | Facility: CLINIC | Age: 60
End: 2025-11-07
Payer: COMMERCIAL